# Patient Record
Sex: FEMALE | Race: WHITE | NOT HISPANIC OR LATINO | Employment: UNEMPLOYED | URBAN - METROPOLITAN AREA
[De-identification: names, ages, dates, MRNs, and addresses within clinical notes are randomized per-mention and may not be internally consistent; named-entity substitution may affect disease eponyms.]

---

## 2020-10-24 ENCOUNTER — HOSPITAL ENCOUNTER (EMERGENCY)
Facility: HOSPITAL | Age: 40
Discharge: HOME | End: 2020-10-24
Attending: EMERGENCY MEDICINE | Admitting: EMERGENCY MEDICINE

## 2020-10-24 VITALS
RESPIRATION RATE: 16 BRPM | HEART RATE: 66 BPM | SYSTOLIC BLOOD PRESSURE: 110 MMHG | TEMPERATURE: 98.3 F | OXYGEN SATURATION: 98 % | DIASTOLIC BLOOD PRESSURE: 75 MMHG

## 2020-10-24 DIAGNOSIS — R41.82 ALTERED MENTAL STATUS, UNSPECIFIED ALTERED MENTAL STATUS TYPE: Primary | ICD-10-CM

## 2020-10-24 DIAGNOSIS — F10.920 ACUTE ALCOHOLIC INTOXICATION WITHOUT COMPLICATION (HCC): ICD-10-CM

## 2020-10-24 PROCEDURE — 99283 EMERGENCY DEPT VISIT LOW MDM: CPT

## 2020-11-05 NOTE — ED PROVIDER NOTES
EMERGENCY DEPARTMENT ENCOUNTER    Room Number:  08/08  Date seen:  11/5/2020  Time seen: 18:38 EST  PCP: Priyanka Cortes APRN  Historian: patient and sister      HPI:  Chief Complaint: altered mental status    A complete HPI/ROS/PMH/PSH/SH/FH are unobtainable due to: none    Context: Misty Pastrana is a 39 y.o. female who presents to the ED for evaluation of altered mental status that now seems to have resolved.  It onset suddenly and lasted for about an hour and gradually improved.  It started after having 3 glasses of wine at dinner and her brothers place and when she and her sister arrived home the patient was too sedate to get out of the car, not responding properly.  Really worried sister and she then called 911.  The patient vomited during transport to the ER.  At this time she is awake alert and oriented.  She recalls dinner and feeling very drowsy in the car and coming to and vomiting in the EMS truck.  She denies any complaints at this time other than feeling anxious that she is in the ER.  She denies any current nausea, abdominal pain or diarrhea, chest pain shortness of breath as well as any subjective fever or chills.        PAST MEDICAL HISTORY  Active Ambulatory Problems     Diagnosis Date Noted   • No Active Ambulatory Problems     Resolved Ambulatory Problems     Diagnosis Date Noted   • No Resolved Ambulatory Problems     No Additional Past Medical History         PAST SURGICAL HISTORY  No past surgical history on file.      FAMILY HISTORY  No family history on file.      SOCIAL HISTORY  Social History     Socioeconomic History   • Marital status:      Spouse name: Not on file   • Number of children: Not on file   • Years of education: Not on file   • Highest education level: Not on file         ALLERGIES  Patient has no known allergies.        REVIEW OF SYSTEMS  Review of Systems     All systems reviewed and negative except for those discussed in HPI.       PHYSICAL EXAM  ED  Triage Vitals   Temp Heart Rate Resp BP SpO2   10/24/20 0205 10/24/20 0017 10/24/20 0017 10/24/20 0017 10/24/20 0017   98.3 °F (36.8 °C) 98 16 116/73 98 %      Temp src Heart Rate Source Patient Position BP Location FiO2 (%)   -- -- -- -- --                GENERAL: not distressed  HENT: atraumatic  EYES: no scleral icterus  CV: regular rhythm, regular rate  RESPIRATORY: normal effort CTA B  ABDOMEN: soft, nontender nondistended  MUSCULOSKELETAL: no deformity  NEURO: alert, oriented x3, moves all extremities, follows commands, nonfocal neurologic exam  SKIN: warm, dry    Vital signs and nursing notes reviewed.              PROGRESS AND CONSULTS      I suspect the patient's altered mental status, drowsiness and vomiting was secondary to alcohol intoxication.  The patient verbalizes this as well.  I have offered her further testing including labs, alcohol level and a drug screen as well as medication for nausea and rehydration with IV fluids.  She states she feels fine, is back to baseline and would like to go home, declines any further testing or treatment.  Sister is comfortable taking her home now, states she is back to baseline.  I think this is very reasonable and the patient will be discharged.        Reviewed pt's history and workup with Dr. Link.  After a bedside evaluation; they agree with the plan of care      Patient was placed in face mask in first look. Patient was wearing facemask each time I entered the room and throughout our encounter. I wore protective equipment throughout this patient encounter including a face mask, eye shield and gloves. Hand hygiene was performed before donning protective equipment and after removal when leaving the room.        DIAGNOSIS  Final diagnoses:   Altered mental status, unspecified altered mental status type   Acute alcoholic intoxication without complication (CMS/HCC)               Latest Documented Vital Signs:  As of 18:38 EST  BP- 110/75 HR- 66 Temp- 98.3 °F (36.8  °C) O2 sat- 98%       Nae Haynes PA  11/05/20 1841

## 2020-11-19 ENCOUNTER — TELEPHONE (OUTPATIENT)
Dept: ORTHOPEDICS | Facility: OTHER | Age: 40
End: 2020-11-19

## 2020-12-03 ENCOUNTER — TREATMENT (OUTPATIENT)
Dept: PHYSICAL THERAPY | Facility: CLINIC | Age: 40
End: 2020-12-03

## 2020-12-03 DIAGNOSIS — M62.9 MUSCULOSKELETAL DISORDER INVOLVING UPPER TRAPEZIUS MUSCLE: ICD-10-CM

## 2020-12-03 DIAGNOSIS — M54.2 CERVICAL PAIN: Primary | ICD-10-CM

## 2020-12-03 DIAGNOSIS — M54.6 ACUTE LEFT-SIDED THORACIC BACK PAIN: ICD-10-CM

## 2020-12-03 PROCEDURE — 97162 PT EVAL MOD COMPLEX 30 MIN: CPT | Performed by: PHYSICAL THERAPIST

## 2020-12-03 PROCEDURE — 97014 ELECTRIC STIMULATION THERAPY: CPT | Performed by: PHYSICAL THERAPIST

## 2020-12-03 PROCEDURE — 97110 THERAPEUTIC EXERCISES: CPT | Performed by: PHYSICAL THERAPIST

## 2020-12-04 NOTE — PROGRESS NOTES
Physical Therapy Initial Evaluation and Plan of Care      Patient: Misty Pastrana   : 1980  Diagnosis/ICD-10 Code:  Cervical pain [M54.2]  Referring practitioner: DAREK Eugene  Date of Initial Visit: 12/3/2020  Today's Date: 2020  Patient seen for 1 sessions           Subjective Evaluation    History of Present Illness  Mechanism of injury: Pt notes hx of anxiety/ depression effecting symptoms.  Pain with reaching/ lifting/ supporting son with dressing-grooming-bathing activities. Denies headaches. Pt reports poor tolerance with sleep some days not sleeping.    Subjective comment: chronic progressively acute (L) UT/ perscapular pain.  reports intermittent bouts of (L) c5-6 radicular pain.   Quality of life: good    Pain  Current pain ratin  At best pain ratin  At worst pain ratin  Quality: burning, dull ache, throbbing, tight, radiating, sharp and cramping  Aggravating factors: overhead activity, sleeping, outstretched reach, repetitive movement, lifting and movement  Progression: worsening    Patient Goals  Patient goals for therapy: increased strength, independence with ADLs/IADLs, return to work, increased motion, improved balance and decreased pain             Objective        Special Questions  Patient is experiencing disturbed sleep.       Postural Observations  Seated posture: fair  Standing posture: fair        Palpation   Left   Hypertonic in the levator scapulae, lower trapezius, middle trapezius, rhomboids and upper trapezius.   Muscle spasm in the levator scapulae, lower trapezius, middle trapezius, rhomboids and upper trapezius.   Tenderness of the levator scapulae, lower trapezius, middle trapezius, rhomboids and upper trapezius.     Neurological Testing     Sensation   Cervical/Thoracic   Left   Intact: light touch    Right   Intact: light touch    Reflexes   Left   Deltoid (C5): trace (1+)  Biceps (C5/C6): trace (1+)  Brachioradialis (C6): trace (1+)    Right    Deltoid (C5): trace (1+)  Biceps (C5/C6): trace (1+)  Brachioradialis (C6): trace (1+)    Active Range of Motion   Cervical/Thoracic Spine   Cervical    Flexion: 45 degrees   Left lateral flexion: 25 degrees with pain  Right lateral flexion: 12 degrees   Left rotation: 29 degrees with pain  Right rotation: 22 degrees     Thoracic   Flexion: WFL  Extension: with pain    Additional Active Range of Motion Details  (+) scapulohumeral dyskinesia; (+) scapular winging (B) ; atrophy of serratus anterior    Strength/Myotome Testing   Cervical Spine   Neck extension: 3+  Neck flexion: 3+    Left   Neck lateral flexion (C3): 3+  Levator scapulae (C4): 4-    Right   Neck lateral flexion (C3): 3+  Levator scapulae (C4): 3+    Left Shoulder     Planes of Motion   Flexion: 3+   Extension: 4-   Abduction: 3+   Adduction: 4   External rotation at 0°: 4-   Internal rotation at 0°: 4+     Isolated Muscles   Levator scapulae: 4-   Lower trapezius: 4-   Middle trapezius: 3+   Serratus anterior: 3+   Upper trapezius: 4+     Right Shoulder     Planes of Motion   Flexion: 3+   Extension: 4-   Abduction: 3+   Adduction: 4   External rotation at 0°: 4-   Internal rotation at 0°: 4+     Isolated Muscles   Levator scapulae: 3+   Lower trapezius: 3+   Middle trapezius: 3+   Serratus anterior: 3+   Upper trapezius: 4     Left Elbow   Flexion: 4+  Extension: 4+    Right Elbow   Flexion: 4+  Extension: 4+    Left Wrist/Hand   Wrist extension: 4+  Wrist flexion: 4+  Radial deviation: 4+  Ulnar deviation: 4+    Right Wrist/Hand   Wrist extension: 4+  Wrist flexion: 4+  Radial deviation: 4+  Ulnar deviation: 4+        See Exercise, Manual, and Modality Logs for complete treatment.   Functional outcome score: 54%cervical  oswestry            Assessment & Plan     Assessment  Impairments: abnormal gait, abnormal or restricted ROM, impaired balance, impaired physical strength, pain with function and safety issue  Assessment details: Misty Pastrana  is a 40 y.o. year-old female referred to physical therapy for cervical/ thoracic pain/ (L) upper trapezius strain. She presents with a stable clinical presentation.  She has comorbidities of anxiety and no personal factors that may affect her progress in the plan of care.  Pt demonstrates decreased cervical/ thoracic AROM/ MMT resulting in acute bouts of (L) UT/rhomboid/ serratus anterior ms spasm.  (+) scapulohumeral dyskinesia is noted.  Pt reports 6/ 10 pain , poor tolerance with prolonged sleep/ management of son secondary to difficulty with reaching/ lifting/ pulling activities. Signs and symptoms are consistent with physical therapy diagnosis of chronically acute cervical/ thoracic pain/ strain  Prognosis: good  Functional Limitations: walking, uncomfortable because of pain, standing and stooping  Goals  Plan Goals: stg 6 wks    Pt to be educated/ independent with initial HEP specific to precautions/ restrictions/ correct sitting posture.    Decreased (L) UT/ serratus anterior ttp from severe to minimal to allow for 6 hours continuous sleep.    Increased cervical AROM in rotation/ sB  (R)= (L) to allow for increased ease w dressing/ grooming activities.    ltg 12 wks    Increase cervical/ thoracic postural strength 4/5 at 90 degrees to allow for increased ease with reaching/ light/ caring for son without evidence of pain > 2/10 consistently.    Pt to deny radicular pain (B) UE consistently to allow for increased ease with dressing/ grooming/ reaching activities.     Improve oswestry score from 54% to 34%    Plan  Therapy options: will be seen for skilled physical therapy services  Planned modality interventions: cryotherapy, electrical stimulation/Russian stimulation, TENS, ultrasound and thermotherapy (hydrocollator packs)  Planned therapy interventions: abdominal trunk stabilization, manual therapy, motor coordination training, neuromuscular re-education, balance/weight-bearing training, ADL retraining,  flexibility, functional ROM exercises, gait training, home exercise program, joint mobilization, transfer training, therapeutic activities, strengthening, stretching and soft tissue mobilization  Duration in weeks: 12  Treatment plan discussed with: patient        Timed:  Manual Therapy:       mins  00306;  Therapeutic Exercise: 15        mins  05596;     Neuromuscular Melissa:        mins  26696;    Therapeutic Activity:          mins  69582;     Gait Training:           mins  15538;     Ultrasound:          mins  65563;    Electrical Stimulation:     8    mins  01846 ( );  Iontophoresis         mins 84929  Dry Needling        mins      Untimed:  Electrical Stimulation:         mins  40972 ( );  Mechanical Traction:         mins  96292;     Timed Treatment:   23 mins   Total Treatment:    50 mins    PT SIGNATURE: Ck Pennington, PT   DATE TREATMENT INITIATED: 12/4/2020    Initial Certification  Certification Period: 3/4/2021  I certify that the therapy services are furnished while this patient is under my care.  The services outlined above are required by this patient, and will be reviewed every 90 days.     PHYSICIAN: Jacques Turner, DAREK      DATE:     Please sign and return via fax to [phone] Thank you, [facility].

## 2020-12-08 ENCOUNTER — TREATMENT (OUTPATIENT)
Dept: PHYSICAL THERAPY | Facility: CLINIC | Age: 40
End: 2020-12-08

## 2020-12-08 DIAGNOSIS — M62.9 MUSCULOSKELETAL DISORDER INVOLVING UPPER TRAPEZIUS MUSCLE: ICD-10-CM

## 2020-12-08 DIAGNOSIS — M54.2 CERVICAL PAIN: Primary | ICD-10-CM

## 2020-12-08 PROCEDURE — 97110 THERAPEUTIC EXERCISES: CPT | Performed by: PHYSICAL THERAPIST

## 2020-12-08 PROCEDURE — 97530 THERAPEUTIC ACTIVITIES: CPT | Performed by: PHYSICAL THERAPIST

## 2020-12-08 NOTE — PROGRESS NOTES
Physical Therapy Daily Progress Note    Patient: Misty Pastrana   : 1980  Diagnosis/ICD-10 Code:  Cervical pain [M54.2]  Referring practitioner: DAREK Eugene  Date of Initial Visit: Type: THERAPY  Noted: 2020  Today's Date: 2020  Patient seen for 2 sessions           Subjective compliant with precautions/ restrictions. Comfortable with initial HEP    Objective   See Exercise, Manual, and Modality Logs for complete treatment.       Assessment/Plan  Progressed with cervical/ thoracic AROM; implemented mild periscapular AROM/ stabilization per tolerance without exacerbation ofacute symptoms of pain  Issued progressed HEPL          Timed:    Manual Therapy:        mins  20303;  Therapeutic Exercise:       15  mins  38772;     Neuromuscular Melissa:        mins  63861;    Therapeutic Activity:       30   mins  93179;     Gait Training:           mins  40293;     Ultrasound:          mins  24429;    Electrical Stimulation:         mins  28605 ( );  Iontophoresis         mins 98161;  Aquatic Therapy         mins 70577;  Dry Needling                   mins    Untimed:  Electrical Stimulation:         mins  24256 (MC );  Mechanical Traction:         mins  66869;     Timed 45:      mins   Total Treatment:    45    mins  Ck Pennington, PT  Physical Therapist

## 2020-12-15 ENCOUNTER — TREATMENT (OUTPATIENT)
Dept: PHYSICAL THERAPY | Facility: CLINIC | Age: 40
End: 2020-12-15

## 2020-12-15 DIAGNOSIS — M62.9 MUSCULOSKELETAL DISORDER INVOLVING UPPER TRAPEZIUS MUSCLE: ICD-10-CM

## 2020-12-15 DIAGNOSIS — M54.2 CERVICAL PAIN: Primary | ICD-10-CM

## 2020-12-15 PROCEDURE — 97110 THERAPEUTIC EXERCISES: CPT | Performed by: PHYSICAL THERAPIST

## 2020-12-15 PROCEDURE — 97530 THERAPEUTIC ACTIVITIES: CPT | Performed by: PHYSICAL THERAPIST

## 2020-12-15 NOTE — PROGRESS NOTES
Physical Therapy Daily Progress Note    Patient: Misty Pastrana   : 1980  Diagnosis/ICD-10 Code:  Cervical pain [M54.2]  Referring practitioner: DAREK Eugene  Date of Initial Visit: Type: THERAPY  Noted: 2020  Today's Date: 12/15/2020  Patient seen for 3 sessions           Subjective off my IUD.  hormorne levels for not good.  Im sleeping much better. now    Objective   See Exercise, Manual, and Modality Logs for complete treatment.       Assessment/Plan    Mild cuing with modified postural strengthening / AROM activities per pt tolerance. Pt guarding with movement, decreaed apprehension with AROM this session . Encouraged with progress minimal pain today. Issued progressed HEP       Timed:    Manual Therapy:     15   mins  00360;  Therapeutic Exercise:         mins  01550;     Neuromuscular Melissa:        mins  06880;    Therapeutic Activity:    20      mins  67848;     Gait Training:           mins  20956;     Ultrasound:          mins  91492;    Electrical Stimulation:         mins  43408 ( );  Iontophoresis         mins 10142;  Aquatic Therapy         mins 60351;  Dry Needling                   mins    Untimed:  Electrical Stimulation:         mins  13369 (MC );  Mechanical Traction:         mins  36507;     Timed Treatment:35      mins   Total Treatment:    35    mins  Ck Pennington, PT  Physical Therapist

## 2020-12-17 ENCOUNTER — TREATMENT (OUTPATIENT)
Dept: PHYSICAL THERAPY | Facility: CLINIC | Age: 40
End: 2020-12-17

## 2020-12-17 DIAGNOSIS — M54.2 CERVICAL PAIN: Primary | ICD-10-CM

## 2020-12-17 DIAGNOSIS — M62.9 MUSCULOSKELETAL DISORDER INVOLVING UPPER TRAPEZIUS MUSCLE: ICD-10-CM

## 2020-12-17 PROCEDURE — 97530 THERAPEUTIC ACTIVITIES: CPT | Performed by: PHYSICAL THERAPIST

## 2020-12-17 PROCEDURE — 97110 THERAPEUTIC EXERCISES: CPT | Performed by: PHYSICAL THERAPIST

## 2020-12-17 NOTE — PROGRESS NOTES
Physical Therapy Daily Progress Note    Patient: Misty Pastrana   : 1980  Diagnosis/ICD-10 Code:  Cervical pain [M54.2]  Referring practitioner: DAREK Eugene  Date of Initial Visit: Type: THERAPY  Noted: 2020  Today's Date: 2020  Patient seen for 4 sessions           Subjective symptoms improving more confidence with moving.  Pain controlled and minimal at this time. Denies headaches  Objective   See Exercise, Manual, and Modality Logs for complete treatment.       Assessment/Plan  Progressed with thoracic / shoulder AROM; implemented periscapular strengthening below 90 degrees no pain with pre.  Pt very encouraged with progress.  Apprehension with shoudler flexion > 165  (R) UE.          Timed:    Manual Therapy:       mins  53499;  Therapeutic Exercise:      30   mins  51995;     Neuromuscular Melissa:        mins  23592;    Therapeutic Activity:      10    mins  10881;     Gait Training:           mins  11745;     Ultrasound:          mins  18827;    Electrical Stimulation:         mins  05663 ( );  Iontophoresis         mins 08935;  Aquatic Therapy         mins 25543;  Dry Needling                   mins    Untimed:  Electrical Stimulation:         mins  40942 (MC );  Mechanical Traction:         mins  24970;     Timed Treatment:   40   mins   Total Treatment:      40  mins  Ck Pennington, PT  Physical Therapist

## 2020-12-23 ENCOUNTER — TREATMENT (OUTPATIENT)
Dept: PHYSICAL THERAPY | Facility: CLINIC | Age: 40
End: 2020-12-23

## 2020-12-23 DIAGNOSIS — M54.6 ACUTE LEFT-SIDED THORACIC BACK PAIN: ICD-10-CM

## 2020-12-23 DIAGNOSIS — M54.2 CERVICAL PAIN: Primary | ICD-10-CM

## 2020-12-23 DIAGNOSIS — M62.9 MUSCULOSKELETAL DISORDER INVOLVING UPPER TRAPEZIUS MUSCLE: ICD-10-CM

## 2020-12-23 PROCEDURE — 97110 THERAPEUTIC EXERCISES: CPT | Performed by: PHYSICAL THERAPIST

## 2020-12-23 PROCEDURE — 97014 ELECTRIC STIMULATION THERAPY: CPT | Performed by: PHYSICAL THERAPIST

## 2020-12-23 PROCEDURE — 97530 THERAPEUTIC ACTIVITIES: CPT | Performed by: PHYSICAL THERAPIST

## 2020-12-23 NOTE — PROGRESS NOTES
Physical Therapy Daily Progress Note    Patient: Misty Pastrana   : 1980  Diagnosis/ICD-10 Code:  Cervical pain [M54.2]  Referring practitioner: DAREK Eugene  Date of Initial Visit: Type: THERAPY  Noted: 2020  Today's Date: 2020  Patient seen for 5 sessions           Subjective exacerbation of cervical/ thoracic pain. I helped my son ice skate. Holding him excacerbated my symptoms.     Objective   See Exercise, Manual, and Modality Logs for complete treatment.       Assessment/Plan    progresseed with tens mh to decrease periscapular pain. Implemented cervical/ thoracic AROM per tolerance.mild cuing with HEP. Decreased pain from 6/ 10 to 3/ 10 post treatment       Timed:    Manual Therapy:        mins  59285;  Therapeutic Exercise:   15    mins  25025;     Neuromuscular Melissa:       mins  64519;    Therapeutic Activity:      15    mins  63381;     Gait Training:           mins  99902;     Ultrasound:          mins  58214;    Electrical Stimulation:    10     mins  54524 ( );  Iontophoresis         mins 47497;  Aquatic Therapy         mins 72090;  Dry Needling                   mins    Untimed:  Electrical Stimulation:         mins  78511 ( );  Mechanical Traction:         mins  22156;     Timed Treatment:  40    mins   Total Treatment:  40      mins  Ck Pennington PT  Physical Therapist

## 2020-12-28 ENCOUNTER — TREATMENT (OUTPATIENT)
Dept: PHYSICAL THERAPY | Facility: CLINIC | Age: 40
End: 2020-12-28

## 2020-12-28 DIAGNOSIS — M62.9 MUSCULOSKELETAL DISORDER INVOLVING UPPER TRAPEZIUS MUSCLE: ICD-10-CM

## 2020-12-28 DIAGNOSIS — M54.2 CERVICAL PAIN: Primary | ICD-10-CM

## 2020-12-28 PROCEDURE — 97014 ELECTRIC STIMULATION THERAPY: CPT | Performed by: PHYSICAL THERAPIST

## 2020-12-28 PROCEDURE — 97110 THERAPEUTIC EXERCISES: CPT | Performed by: PHYSICAL THERAPIST

## 2020-12-28 NOTE — PROGRESS NOTES
Physical Therapy Daily Progress Note    Patient: Misty Pastrana   : 1980  Diagnosis/ICD-10 Code:  Cervical pain [M54.2]  Referring practitioner: DAREK Eugene  Date of Initial Visit: Type: THERAPY  Noted: 2020  Today's Date: 2020  Patient seen for 6 sessions           Subjective muscle pain is better. Radiating tricep pain.      Objective   See Exercise, Manual, and Modality Logs for complete treatment.       Assessment/Plan   moderate ttp (B) UT/ periscapular region. Continued with postural AROM/ thoracic AROM; strengthening per tolerance implemented tens to decreased UT ms guarding.          Timed:    Manual Therapy:        mins  86158;  Therapeutic Exercise:  25       mins  06310;     Neuromuscular Melissa:        mins  05542;    Therapeutic Activity:          mins  64122;     Gait Training:           mins  74953;     Ultrasound:          mins  48469;    Electrical Stimulation:         mins  66014 ( );  Iontophoresis         mins 85055;  Aquatic Therapy         mins 56478;  Dry Needling                   mins    Untimed:  Electrical Stimulation:    10     mins  15859 ( );  Mechanical Traction:         mins  65538;     Timed Treatment:  35    mins   Total Treatment:     35   mins  Ck Pennington, PT  Physical Therapist

## 2020-12-30 ENCOUNTER — TELEPHONE (OUTPATIENT)
Dept: ORTHOPEDICS | Facility: OTHER | Age: 40
End: 2020-12-30

## 2021-01-15 ENCOUNTER — TREATMENT (OUTPATIENT)
Dept: PHYSICAL THERAPY | Facility: CLINIC | Age: 41
End: 2021-01-15

## 2021-01-15 DIAGNOSIS — M54.2 CERVICAL PAIN: Primary | ICD-10-CM

## 2021-01-15 DIAGNOSIS — M54.6 ACUTE LEFT-SIDED THORACIC BACK PAIN: ICD-10-CM

## 2021-01-15 DIAGNOSIS — M62.9 MUSCULOSKELETAL DISORDER INVOLVING UPPER TRAPEZIUS MUSCLE: ICD-10-CM

## 2021-01-15 PROCEDURE — 97110 THERAPEUTIC EXERCISES: CPT | Performed by: PHYSICAL THERAPIST

## 2021-01-15 PROCEDURE — 97530 THERAPEUTIC ACTIVITIES: CPT | Performed by: PHYSICAL THERAPIST

## 2021-01-15 NOTE — PROGRESS NOTES
30-Day / 10-Visit Progress Note         Patient: Misty Pastrana   : 1980  Diagnosis/ICD-10 Code:  Cervical pain [M54.2]  Referring practitioner: DAREK Eugene  Date of Initial Visit: Type: THERAPY  Noted: 2020  Today's Date: 1/15/2021  Patient seen for 7 sessions      Subjective:     Clinical Progress: improved  Home Program Compliance: Yes  Treatment has included:  therapeutic exercise    Subjective Evaluation    History of Present Illness    Subjective comment: pt denies headache.  no pain at this time. symptoms now more intermittent towards later in the day. not constant pain. encouraged with progress. Quality of life: good    Pain  Current pain ratin  At best pain ratin  At worst pain ratin  Quality: dull ache, tight, sharp and radiating    Patient Goals  Patient goals for therapy: increased strength, independence with ADLs/IADLs, return to sport/leisure activities, return to work, increased motion, improved balance and decreased pain         Objective   See Exercise, Manual, and Modality Logs for complete treatment.         Assessment & Plan     Assessment  Impairments: abnormal gait, abnormal or restricted ROM, activity intolerance, impaired balance, impaired physical strength, pain with function and safety issue  Assessment details: Misty Pastrana is a 40 y.o. year-old female referred to physical therapy for cervical/ thoracic pain/ (L) upper trapezius strain. She presents with a stable clinical presentation.  She has comorbidities of anxiety and no personal factors that may affect her progress in the plan of care. Pt has been seen in PT intermittently secondary to holidays and is progressing well with all intervention provided as evidence of intermittent pain pattern/ denies recent hx of headaches.  Pt exhibits full cervical / thoracic AROM. Mild ttp (L) UT.  Pt continues to exhibit decreased cervical/ thoracic postural strength specific but not limited to serratus  anterior 4-/5 at 90 degrees shoulder flexion.  Increased ease with continuous sleep of 6 hours, however continues to note difficulty with management of son secondary to difficulty with reaching/ lifting/ pulling activities. Pt is appropriate for recertification at this time to allow for attainment of functional goals.  Pt agrees with POC and is satisfied with all intervention provided. Signs and symptoms are consistent with physical therapy diagnosis of chronically acute cervical/ thoracic pain/ strain  Prognosis: good    Prognosis: good  Functional Limitations: walking, uncomfortable because of pain, standing and stooping  Goals  Plan Goals: Plan Goals: stg 6 wks    Pt to be educated/ independent with initial HEP specific to precautions/ restrictions/ correct sitting posture. met    Decreased (L) UT/ serratus anterior ttp from severe to minimal to allow for 6 hours continuous sleep. met    Increased cervical AROM in rotation/ sB  (R)= (L) to allow for increased ease w dressing/ grooming activities. met    ltg 12 wks    Increase cervical/ thoracic postural strength 4/5 at 90 degrees to allow for increased ease with reaching/ light/ caring for son without evidence of pain > 2/10 consistently. ongoing    Pt to deny radicular pain (B) UE consistently to allow for increased ease with dressing/ grooming/ reaching activities. ongoing    Improve oswestry score from 54% to 34% ongoing      Plan  Therapy options: will be seen for skilled physical therapy services  Planned modality interventions: cryotherapy, electrical stimulation/Russian stimulation, TENS, ultrasound and thermotherapy (hydrocollator packs)  Planned therapy interventions: abdominal trunk stabilization, manual therapy, motor coordination training, neuromuscular re-education, balance/weight-bearing training, ADL retraining, flexibility, functional ROM exercises, gait training, home exercise program, joint mobilization, transfer training, therapeutic  activities, strengthening, stretching and soft tissue mobilization  Frequency: 1x week  Duration in weeks: 12  Treatment plan discussed with: patient           Recommendations: Continue as planned  Timeframe: 1 month  Prognosis to achieve goals: good    PT Signature: Ck Pennington, PT      Based upon review of the patient's progress and continued therapy plan, it is my medical opinion that Misty Pastrana should continue physical therapy treatment at [facility]  [address] [place] [address]  [phone].    Signature: __________________________________  Jacques Turner APRN    Timed:  Manual Therapy:         mins  62908;  Therapeutic Exercise:     15    mins  86502;     Neuromuscular Melissa:        mins  87675;    Therapeutic Activity:      24    mins  64063;     Gait Training:           mins  93091;     Ultrasound:          mins  26180;    Electrical Stimulation:         mins  84033 ( );  Iontophoresis         mins 50030;  Dry Needling                   mins    Untimed:  Electrical Stimulation:         mins  25839 ( );  Mechanical Traction:         mins  30074;     Timed Treatment: 39     mins   Total Treatment:     39   mins

## 2021-02-03 ENCOUNTER — TREATMENT (OUTPATIENT)
Dept: PHYSICAL THERAPY | Facility: CLINIC | Age: 41
End: 2021-02-03

## 2021-02-03 DIAGNOSIS — M62.9 MUSCULOSKELETAL DISORDER INVOLVING UPPER TRAPEZIUS MUSCLE: ICD-10-CM

## 2021-02-03 DIAGNOSIS — M54.2 CERVICAL PAIN: Primary | ICD-10-CM

## 2021-02-03 PROCEDURE — 97530 THERAPEUTIC ACTIVITIES: CPT | Performed by: PHYSICAL THERAPIST

## 2021-02-03 PROCEDURE — 97110 THERAPEUTIC EXERCISES: CPT | Performed by: PHYSICAL THERAPIST

## 2021-02-04 NOTE — PROGRESS NOTES
Physical Therapy Daily Progress Note    Patient: Misty Pastrana   : 1980  Diagnosis/ICD-10 Code:  Cervical pain [M54.2]  Referring practitioner: DAREK Eugene  Date of Initial Visit: Type: THERAPY  Noted: 2020  Today's Date: 2021  Patient seen for 8 sessions           Subjective very encouraged. No head ache.  I have days with no pain.    Objective   See Exercise, Manual, and Modality Logs for complete treatment.       Assessment/Plan  Intermittent symptoms of cervical/ periscapular ms spasm.  Symptoms resolving. Full cervical/ thoracic / shoulder AROM  Mild cuing with periscapular strengthening below 90 degrees.            Timed:    Manual Therapy:        mins  83643;  Therapeutic Exercise:         mins  86284;   10  Neuromuscular Melissa:        mins  37783;    Therapeutic Activity:      25    mins  77858;     Gait Training:           mins  11871;     Ultrasound:          mins  19540;    Electrical Stimulation:         mins  16176 ( );  Iontophoresis         mins 66760;  Aquatic Therapy         mins 31153;  Dry Needling                   mins    Untimed:  Electrical Stimulation:         mins  32074 ( );  Mechanical Traction:         mins  91670;     Timed Treatment:  35    mins   Total Treatment:     35   mins  Ck Pennington, PT  Physical Therapist

## 2021-02-08 ENCOUNTER — OFFICE VISIT (OUTPATIENT)
Dept: OBSTETRICS AND GYNECOLOGY | Facility: CLINIC | Age: 41
End: 2021-02-08

## 2021-02-08 VITALS
BODY MASS INDEX: 21.44 KG/M2 | HEIGHT: 64 IN | WEIGHT: 125.6 LBS | DIASTOLIC BLOOD PRESSURE: 86 MMHG | SYSTOLIC BLOOD PRESSURE: 136 MMHG

## 2021-02-08 DIAGNOSIS — Z80.3 FAMILY HISTORY OF BREAST CANCER: Primary | ICD-10-CM

## 2021-02-08 DIAGNOSIS — Z13.9 SCREENING FOR UNSPECIFIED CONDITION: ICD-10-CM

## 2021-02-08 DIAGNOSIS — Z30.2 REQUEST FOR STERILIZATION: ICD-10-CM

## 2021-02-08 LAB
B-HCG UR QL: NEGATIVE
BILIRUB BLD-MCNC: NEGATIVE MG/DL
CLARITY, POC: CLEAR
COLOR UR: YELLOW
GLUCOSE UR STRIP-MCNC: NEGATIVE MG/DL
INTERNAL NEGATIVE CONTROL: NEGATIVE
INTERNAL POSITIVE CONTROL: POSITIVE
KETONES UR QL: NEGATIVE
LEUKOCYTE EST, POC: NEGATIVE
Lab: NORMAL
NITRITE UR-MCNC: NEGATIVE MG/ML
PH UR: 5 [PH] (ref 5–8)
PROT UR STRIP-MCNC: NEGATIVE MG/DL
RBC # UR STRIP: NEGATIVE /UL
SP GR UR: 1 (ref 1–1.03)
UROBILINOGEN UR QL: NORMAL

## 2021-02-08 PROCEDURE — 99203 OFFICE O/P NEW LOW 30 MIN: CPT | Performed by: OBSTETRICS & GYNECOLOGY

## 2021-02-08 PROCEDURE — 81025 URINE PREGNANCY TEST: CPT | Performed by: OBSTETRICS & GYNECOLOGY

## 2021-02-08 RX ORDER — NAPROXEN 500 MG/1
500 TABLET ORAL 2 TIMES DAILY PRN
COMMUNITY
Start: 2020-12-23 | End: 2021-07-01

## 2021-02-08 RX ORDER — IBUPROFEN 400 MG/1
400 TABLET ORAL EVERY 6 HOURS PRN
COMMUNITY

## 2021-02-08 RX ORDER — SODIUM CHLORIDE 0.9 % (FLUSH) 0.9 %
10 SYRINGE (ML) INJECTION AS NEEDED
Status: CANCELLED | OUTPATIENT
Start: 2021-02-08

## 2021-02-08 RX ORDER — BACLOFEN 10 MG/1
10 TABLET ORAL 4 TIMES DAILY PRN
COMMUNITY

## 2021-02-08 RX ORDER — SODIUM CHLORIDE 0.9 % (FLUSH) 0.9 %
3 SYRINGE (ML) INJECTION EVERY 12 HOURS SCHEDULED
Status: CANCELLED | OUTPATIENT
Start: 2021-02-08

## 2021-02-08 NOTE — PROGRESS NOTES
"EVALUATION AND MANAGEMENT ENCOUNTER    Misty Pastrana  Patient new to examiner? Yes  New problem to examiner? Yes  Patient referred? Yes    -----------------------------------------------------HISTORY---------------------------------------------------    Chief Complaint:   Chief Complaint   Patient presents with   • Contraception     bc management       HPI:  Misty Pastrana is a 40 y.o. No obstetric history on file. with Patient's last menstrual period was 01/31/2021. here to discuss permanent sterilization.  Pt can't tolerate any other form of contraception.  Desires tubal.  Pt also requests heriditary cancer screening. Pt has no complaints.         History of Present Illness     Misty Pastrana  reports that she has never smoked. She does not have any smokeless tobacco history on file..         ROS:  Review of Systems   Constitutional: Negative.    HENT: Negative.    Eyes: Negative.    Respiratory: Negative.    Cardiovascular: Negative.    Gastrointestinal: Negative.    Endocrine: Negative.    Musculoskeletal: Negative.    Skin: Negative.    Allergic/Immunologic: Negative.    Neurological: Negative.    Hematological: Negative.    Psychiatric/Behavioral: Negative.    :    -----------------------------------------------PHYSICAL EXAM----------------------------------------------    Vital Signs: /86   Ht 162.6 cm (64\")   Wt 57 kg (125 lb 9.6 oz)   LMP 01/31/2021   BMI 21.56 kg/m²    Flowsheet Rows      First Filed Value   Admission Height  162.6 cm (64\") Documented at 02/08/2021 1537   Admission Weight  57 kg (125 lb 9.6 oz) Documented at 02/08/2021 1537          Physical Exam  Vitals signs and nursing note reviewed.   Constitutional:       Appearance: She is well-developed.   HENT:      Head: Normocephalic and atraumatic.   Neck:      Musculoskeletal: Normal range of motion.   Cardiovascular:      Rate and Rhythm: Normal rate.   Pulmonary:      Effort: Pulmonary effort is normal.   Abdominal:      " General: There is no distension.      Palpations: Abdomen is soft. There is no mass.      Tenderness: There is no abdominal tenderness. There is no guarding.   Genitourinary:     Vagina: No vaginal discharge.   Musculoskeletal: Normal range of motion.         General: No tenderness or deformity.   Skin:     General: Skin is warm and dry.      Coloration: Skin is not pale.      Findings: No erythema or rash.   Neurological:      Mental Status: She is alert and oriented to person, place, and time.   Psychiatric:         Behavior: Behavior normal.         Thought Content: Thought content normal.         Judgment: Judgment normal.         -----------------------------------------------MEDICAL DECISION MAKING-----------------------------        DATA Review & labs ordered:     1.   Lab Results (last 24 hours)     Procedure Component Value Units Date/Time    POC Urinalysis Dipstick [[id number]]  (Normal) Collected: 02/08/21 1529    Specimen: Urine Updated: 02/08/21 1530     Color Yellow     Clarity, UA Clear     Glucose, UA Negative mg/dL      Bilirubin Negative     Ketones, UA Negative     Specific Gravity  1.005     Blood, UA Negative     pH, Urine 5.0     Protein, POC Negative mg/dL      Urobilinogen, UA Normal     Leukocytes Negative     Nitrite, UA Negative    POC Pregnancy, Urine [[id number]]  (Normal) Collected: 02/08/21 1529    Specimen: Urine Updated: 02/08/21 1529     HCG, Urine, QL Negative     Lot Number [id number]     Internal Positive Control Positive     Internal Negative Control Negative        2.   Imaging Results (Last 24 Hours)     ** No results found for the last 24 hours. **        3.   ECG/EMG Results (most recent)     None              Diagnoses and all orders for this visit:    1. Screening for unspecified condition (Primary)  -     POC Urinalysis Dipstick  -     POC Pregnancy, Urine    2. Request for sterilization  -     Case Request; Standing  -     CBC and Differential; Future  -     Pregnancy,  Urine - Urine, Clean Catch; Future  -     sodium chloride 0.9 % flush 3 mL  -     sodium chloride 0.9 % flush 10 mL  -     Case Request    Other orders  -     Follow Anesthesia Guidelines / Standing Orders; Future  -     Chlorhexidine Skin Prep; Future  -     Follow Anesthesia Guidelines / Standing Orders; Standing  -     Obtain informed consent; Standing  -     Place sequential compression device; Standing  -     Verify / Perform Chlorhexidine Skin Prep; Standing  -     hCG, Serum, Qualitative; Standing  -     Insert Peripheral IV; Standing  -     Saline Lock & Maintain IV Access; Standing        IMPRESSION/PROBLEM:      Indicated sterilization    (Established problem/s? No, worsening? No)    (New Problem/s? Yes, additional workup planned? No)      PLAN:     1. LAPAROSCOPIC BILATERAL SALPINGECTOMY FOR PERMANENT STERILIZATION.  2. RISKS, ALTERNATIVES, COMPLICATIONS OF THE PROCEDURE INCLUDING BUT NOT LIMITED TO:    INTRAOPERATIVE RISKS: INJURY TO INTERNAL ORGANS (BOWEL, BLADDER, URETER,BLOOD VESSELS) OR HEMORRHAGE REQUIRING FURTHER SURGERY, INFECTION, AND DEATH;   POSTOP COMPLICATIONS: BLEEDING, INFECTION, PNEUMONIA, PULMONARY EMBOLISM, AND DEATH;   WERE EXPLAINED TO THE PT WHO VERBALIZED HER UNDERSTANDING.    3, Heriditary cancer screening. [employer].    Pt to call for any results from testing promptly if she does not hear from us.     RTO Return in about 4 weeks (around 3/8/2021) for postop check..  Instructions and precautions given.           Bronson Sanford MD  22:02 EST  02/08/21

## 2021-02-17 ENCOUNTER — TREATMENT (OUTPATIENT)
Dept: PHYSICAL THERAPY | Facility: CLINIC | Age: 41
End: 2021-02-17

## 2021-02-17 DIAGNOSIS — M54.6 ACUTE LEFT-SIDED THORACIC BACK PAIN: ICD-10-CM

## 2021-02-17 DIAGNOSIS — M54.2 CERVICAL PAIN: Primary | ICD-10-CM

## 2021-02-17 DIAGNOSIS — M62.9 MUSCULOSKELETAL DISORDER INVOLVING UPPER TRAPEZIUS MUSCLE: ICD-10-CM

## 2021-02-17 PROCEDURE — 97530 THERAPEUTIC ACTIVITIES: CPT | Performed by: PHYSICAL THERAPIST

## 2021-02-17 PROCEDURE — 97110 THERAPEUTIC EXERCISES: CPT | Performed by: PHYSICAL THERAPIST

## 2021-02-18 NOTE — PROGRESS NOTES
30-Day / 10-Visit Progress Note         Patient: Misty Pastrana   : 1980  Diagnosis/ICD-10 Code:  Cervical pain [M54.2]  Referring practitioner: DAREK Eugene  Date of Initial Visit: Type: THERAPY  Noted: 2020  Today's Date: 2021  Patient seen for 9 sessions      Subjective:     Clinical Progress: improved  Home Program Compliance: Yes  Treatment has included:  therapeutic exercise    Subjective Evaluation    History of Present Illness    Subjective comment: no headaches/ sleeping 6-7 hours + a day;  thoracic pain more intermittent and less intense. Quality of life: good    Pain  Current pain ratin  At best pain ratin  At worst pain ratin  Quality: dull ache, radiating, throbbing, tight, sharp and pulling  Aggravating factors: overhead activity, outstretched reach, repetitive movement, standing, movement and lifting    Patient Goals  Patient goals for therapy: increased strength, independence with ADLs/IADLs, return to sport/leisure activities, return to work, increased motion, improved balance and decreased pain         Objective   See Exercise, Manual, and Modality Logs for complete treatment.         Assessment & Plan     Assessment  Impairments: abnormal gait, abnormal or restricted ROM, impaired balance, impaired physical strength, pain with function and safety issue  Assessment details: Misty Pastrana is a 40 y.o. year-old female referred to physical therapy for cervical/ thoracic pain/ (L) upper trapezius strain. She presents with a stable clinical presentation.  She has comorbidities of anxiety and no personal factors that may affect her progress in the plan of care. Pt has been seen in PT intermittently secondary to weather conditions and is progressing well with all intervention provided as evidence of intermittent pain pattern/ denies recent hx of headaches.  Pt exhibits full cervical / thoracic AROM. Mild ttp (L) UT.  Pt is progressing well with all  intervention provided as evidence decreased frequency/ intensity/ duration of pain symptoms. Current pt reports 2/ 10 (L) UT pain without symptoms of radiculopathy.   Posture strength is improving specific but not limited to serratus anterior 4/5 at 90 degrees shoulder flexion.  Increased ease with continuous sleep of 6 -7hours, however continues to note difficulty with management of son secondary to difficulty with reaching/ lifting/ pulling activities.full cervical/ thoracic/shoulder AROM is noted.  Pt denies difficulty with dressing/ bathing/ grooming;  Pt continues to note apprehension with lifting /reaching tasks.  Pt is appropriate for recertification at this time to allow for attainment of functional goals.  Pt agrees with POC and is satisfied with all intervention provided. Signs and symptoms are consistent with physical therapy diagnosis of chronically acute cervical/ thoracic pain/ strain  Prognosis: good    Prognosis: good  Functional Limitations: walking, uncomfortable because of pain, standing and stooping    Prognosis: good  Functional Limitations: walking, uncomfortable because of pain, standing and stooping  Goals  Plan Goals: Goals  Plan Goals: Plan Goals: stg 6 wks    Pt to be educated/ independent with initial HEP specific to precautions/ restrictions/ correct sitting posture. met    Decreased (L) UT/ serratus anterior ttp from severe to minimal to allow for 6 hours continuous sleep. met    Increased cervical AROM in rotation/ sB  (R)= (L) to allow for increased ease w dressing/ grooming activities. met    ltg 12 wks    Increase cervical/ thoracic postural strength 4/5 at 90 degrees to allow for increased ease with reaching/ light/ caring for son without evidence of pain > 2/10 consistently. ongoing    Pt to deny radicular pain (B) UE consistently to allow for increased ease with dressing/ grooming/ reaching activities. ongoing    Improve oswestry score from 54% to 34% ongoing       Plan  Therapy  options: will be seen for skilled physical therapy services  Planned modality interventions: cryotherapy, electrical stimulation/Russian stimulation, TENS, ultrasound and thermotherapy (hydrocollator packs)  Planned therapy interventions: abdominal trunk stabilization, manual therapy, motor coordination training, neuromuscular re-education, balance/weight-bearing training, ADL retraining, flexibility, functional ROM exercises, gait training, home exercise program, joint mobilization, transfer training, therapeutic activities, strengthening, stretching and soft tissue mobilization  Duration in weeks: 12  Treatment plan discussed with: patient           Recommendations: Continue as planned  Timeframe: 1 month  Prognosis to achieve goals: good    PT Signature: Ck Pennington, PT      Based upon review of the patient's progress and continued therapy plan, it is my medical opinion that Misty Pastrana should continue physical therapy treatment at [facility]  [address] [place] [address] [address] [place] [id number] [phone].    Signature: __________________________________  Jacques Turner APRN    Timed:  Manual Therapy:        mins  22544;  Therapeutic Exercise:  10       mins  28735;     Neuromuscular Melissa:        mins  98891;    Therapeutic Activity:      30    mins  75995;     Gait Training:           mins  31599;     Ultrasound:          mins  70740;    Electrical Stimulation:         mins  24235 ( );  Iontophoresis         mins 22127;  Dry Needling                   mins    Untimed:  Electrical Stimulation:         mins  35929 ( );  Mechanical Traction:         mins  39601;     Timed Treatment:  40    mins   Total Treatment:     40   mins

## 2021-03-01 ENCOUNTER — TREATMENT (OUTPATIENT)
Dept: PHYSICAL THERAPY | Facility: CLINIC | Age: 41
End: 2021-03-01

## 2021-03-01 DIAGNOSIS — M54.2 CERVICAL PAIN: Primary | ICD-10-CM

## 2021-03-01 PROCEDURE — 97530 THERAPEUTIC ACTIVITIES: CPT | Performed by: PHYSICAL THERAPIST

## 2021-03-01 PROCEDURE — 97110 THERAPEUTIC EXERCISES: CPT | Performed by: PHYSICAL THERAPIST

## 2021-03-02 NOTE — PROGRESS NOTES
30-Day / 10-Visit Progress Note         Patient: Misty Pastrana   : 1980  Diagnosis/ICD-10 Code:  No primary diagnosis found.  Referring practitioner: ADREK Eugene  Date of Initial Visit: No linked episodes  Today's Date: 3/2/2021  Patient seen for Visit count could not be calculated. Make sure you are using a visit which is associated with an episode. sessions      Subjective:     Clinical Progress: improved  Home Program Compliance: Yes  Treatment has included:  therapeutic exercise    Subjective Evaluation    History of Present Illness    Subjective comment: im sleeping well. 75% decrease in headaches.  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  i fatigue ease but pain is minimal now. Quality of life: good    Pain  Current pain ratin  At best pain ratin  At worst pain ratin  Quality: dull ache, tight, sharp, radiating and cramping    Patient Goals  Patient goals for therapy: increased strength, decreased pain, improved balance, increased motion, return to work, independence with ADLs/IADLs and return to sport/leisure activities         Objective   See Exercise, Manual, and Modality Logs for complete treatment.      Assessment & Plan     Assessment  Impairments: abnormal gait, abnormal or restricted ROM, activity intolerance, impaired balance, impaired physical strength, pain with function and safety issue  Assessment details: Misty Pastrana is a 40 y.o. year-old female referred to physical therapy for cervical/ thoracic pain/ (L) upper trapezius strain. She presents with a stable clinical presentation.  She has comorbidities of anxiety and no personal factors that may affect her progress in the plan of care.  Pt has been seen in PT and is progressing well with all intervention provided secondary to minimal pain this session.  Pt notes 6-7 hr sleep tolerance, decrease onset of headaches by 75%. Increase cervical/ thoracic AROM WFL.  Increase  cervical thoracic postural strength to 4/5 at 90 degrees. Mild ttp (B) UT.  PT emphasis specifically to postural strengthening.  Signs and symptoms are consistent with physical therapy diagnosis of chronically acute cervical/ thoracic pain/ strain  Prognosis: good  Functional Limitations: walking, uncomfortable because of pain, standing and stooping      Prognosis: good  Prognosis details: Plan Goals: stg 6 wks    Pt to be educated/ independent with initial HEP specific to precautions/ restrictions/ correct sitting posture. met  Decreased (L) UT/ serratus anterior ttp from severe to minimal to allow for 6 hours continuous sleep.met    Increased cervical AROM in rotation/ sB  (R)= (L) to allow for increased ease w dressing/ grooming activities.met    ltg 12 wks    Increase cervical/ thoracic postural strength 4/5 at 90 degrees to allow for increased ease with reaching/ light/ caring for son without evidence of pain > 2/10 consistently. ongoing    Pt to deny radicular pain (B) UE consistently to allow for increased ease with dressing/ grooming/ reaching activities. met    Improve oswestry score from 54% to 34% met  Functional Limitations: carrying objects, lifting, sleeping, walking, pushing, uncomfortable because of pain, standing, stooping, reaching overhead and unable to perform repetitive tasks  Plan  Therapy options: will be seen for skilled physical therapy services  Planned modality interventions: cryotherapy, electrical stimulation/Russian stimulation, TENS, ultrasound and thermotherapy (hydrocollator packs)  Planned therapy interventions: abdominal trunk stabilization, manual therapy, motor coordination training, neuromuscular re-education, balance/weight-bearing training, ADL retraining, flexibility, functional ROM exercises, gait training, home exercise program, joint mobilization, transfer training, therapeutic activities, strengthening, stretching and soft tissue mobilization  Frequency: 1x week  Duration  in weeks: 12  Treatment plan discussed with: patient           Recommendations: Continue as planned  Timeframe: 1 month  Prognosis to achieve goals: good    PT Signature: Ck Pennington, PT      Based upon review of the patient's progress and continued therapy plan, it is my medical opinion that Misty Pastrana should continue physical therapy treatment at [facility]  [address] [place] [address] [address] [place] [address]  [phone].    Signature: __________________________________  Jacques Turner APRN    Timed:  Manual Therapy:         mins  73883;  Therapeutic Exercise:    10     mins  59607;     Neuromuscular Melissa:        mins  13050;    Therapeutic Activity:   20      mins  78556;     Gait Training:           mins  74337;     Ultrasound:          mins  54704;    Electrical Stimulation:         mins  97985 ( );  Iontophoresis         mins 07536;  Dry Needling                   mins    Untimed:  Electrical Stimulation:         mins  12934 ( );  Mechanical Traction:         mins  75012;     Timed Treatment: 30    mins   Total Treatment:   30     mins

## 2021-03-10 ENCOUNTER — TREATMENT (OUTPATIENT)
Dept: PHYSICAL THERAPY | Facility: CLINIC | Age: 41
End: 2021-03-10

## 2021-03-10 DIAGNOSIS — M62.9 MUSCULOSKELETAL DISORDER INVOLVING UPPER TRAPEZIUS MUSCLE: ICD-10-CM

## 2021-03-10 DIAGNOSIS — M54.2 CERVICAL PAIN: Primary | ICD-10-CM

## 2021-03-10 PROCEDURE — 97110 THERAPEUTIC EXERCISES: CPT | Performed by: PHYSICAL THERAPIST

## 2021-03-10 PROCEDURE — 97530 THERAPEUTIC ACTIVITIES: CPT | Performed by: PHYSICAL THERAPIST

## 2021-03-10 NOTE — PROGRESS NOTES
Physical Therapy Daily Progress Note    Patient: Misty Pastrana   : 1980  Diagnosis/ICD-10 Code:  Cervical pain [M54.2]  Referring practitioner: DAREK Eugene  Date of Initial Visit: Type: THERAPY  Noted: 2020  Today's Date: 3/10/2021  Patient seen for 11 sessions           Subjective pt denies headaches. No periscapular pain.     Objective   See Exercise, Manual, and Modality Logs for complete treatment.       Assessment/Plan  Progressed with cervical/ thoracic postural strengthening no pain with pre. Improved endurance with functional exercise. Encouraged with tolerance. All stg met. Doing well today.          Timed:    Manual Therapy:         mins  32753;  Therapeutic Exercise:     16    mins  18232;     Neuromuscular Melissa:        mins  97551;    Therapeutic Activity:     20     mins  32895;     Gait Training:           mins  63233;     Ultrasound:          mins  66338;    Electrical Stimulation:         mins  89846 ( );  Iontophoresis         mins 85185;  Aquatic Therapy         mins 95662;  Dry Needling                   mins    Untimed:  Electrical Stimulation:         mins  73810 ( );  Mechanical Traction:         mins  59822;     Timed Treatment:     36 mins   Total Treatment:      36  mins  Ck Pennington, PT  Physical Therapist

## 2021-03-15 ENCOUNTER — APPOINTMENT (OUTPATIENT)
Dept: PREADMISSION TESTING | Facility: HOSPITAL | Age: 41
End: 2021-03-15

## 2021-03-15 VITALS
HEIGHT: 64 IN | DIASTOLIC BLOOD PRESSURE: 84 MMHG | HEART RATE: 78 BPM | BODY MASS INDEX: 21.95 KG/M2 | WEIGHT: 128.6 LBS | SYSTOLIC BLOOD PRESSURE: 126 MMHG | OXYGEN SATURATION: 98 % | RESPIRATION RATE: 16 BRPM

## 2021-03-15 LAB
HCG SERPL QL: NEGATIVE
SARS-COV-2 RNA PNL SPEC NAA+PROBE: NOT DETECTED

## 2021-03-15 PROCEDURE — 87635 SARS-COV-2 COVID-19 AMP PRB: CPT | Performed by: OBSTETRICS & GYNECOLOGY

## 2021-03-15 PROCEDURE — C9803 HOPD COVID-19 SPEC COLLECT: HCPCS

## 2021-03-15 PROCEDURE — 85025 COMPLETE CBC W/AUTO DIFF WBC: CPT | Performed by: OBSTETRICS & GYNECOLOGY

## 2021-03-15 PROCEDURE — 84703 CHORIONIC GONADOTROPIN ASSAY: CPT | Performed by: OBSTETRICS & GYNECOLOGY

## 2021-03-15 RX ORDER — MULTIPLE VITAMINS W/ MINERALS TAB 9MG-400MCG
1 TAB ORAL DAILY
COMMUNITY

## 2021-03-15 NOTE — DISCHARGE INSTRUCTIONS
PRE-ADMISSION TESTING INSTRUCTIONS FOR ADULTS    Take these medications the morning of surgery with a small sip of water:  zoloft      No aspirin, advil, aleve, ibuprofen, naproxen, diet pills, decongestants, or herbal/vitamins for a week prior to surgery.    General Instructions:    • Do not eat solid food after midnight the night before surgery.  No gum, mints, or hard candy after midnight the night before surgery.  • You may drink clear liquids the day of surgery up until 2 hours before your arrival time.  (5:15 am)  • Clear liquids are liquids you can see through. Nothing RED in color.    Plain water    Sports drinks  Sodas     Gelatin (Jell-O)  Fruit juices without pulp such as white grape juice and apple juice  Popsicles that contain no fruit or yogurt  Tea or coffee (no cream or milk added)    • It is beneficial for you to have a clear drink that contains carbohydrates just before you leave your house and before your fasting time begins.  We suggest a 20 ounce bottle of Gatorade or Powerade for non-diabetic patients or a 20 ounce bottle of G2 or Powerade Zero for diabetic patients.  (5:15 am)    • Patients who avoid smoking, chewing tobacco and alcohol for 4 weeks prior to surgery have a reduced risk of post-operative complications.  If at all possible, quit smoking as many days before surgery as you can.    • Do not smoke, use chewing tobacco or drink alcohol the day of surgery    • Bring your C-PAP/ BI-PAP machine if you use one.  • Wear clean comfortable clothes and socks.  • Do not wear contact lenses, lotion, deodorant, or make-up.  Bring a case for your glasses if applicable. You may brush your teeth the morning of surgery.  • You may wear dentures/partials, do not put adhesive/glue on them.  • Bring crutches or walker if applicable.  • Leave all other jewelry and valuables at home.      Preventing a Surgical Site Infection:    • Shower the night before and on the morning of surgery using the  chlorhexidine soap you were given.  Use a clean washcloth with the soap.  Place clean sheets on your bed after showering the night before surgery. Do not use the CHG soap on your hair, face, or private areas. Wash your body gently for five (5) minutes. Do not scrub your skin.  Dry with a clean towel and dress in clean clothing.    • Do not shave the surgical area for 10 days-2 weeks prior to surgery  because the razor can irritate skin and make it easier to develop an infection.  • Make sure you, your family, and all healthcare providers clean their hands with soap and water or an alcohol based hand  before caring for you or your wound.      Day of surgery:    Your surgeon’s office will advise you of your arrival time for the day of surgery.    Upon arrival, a Pre-op nurse and Anesthesia provider will review your health history, obtain vital signs, and answer questions you may have.  The only belongings needed at this time will be your home medications and if applicable your C-PAP/BI-PAP machine.  If you are staying overnight your family can leave the rest of your belongings in the car and bring them to your room later.  A Pre-op nurse will start an IV and you may receive medication in preparation for surgery, including something to help you relax.  Your family will be able to see you in the Pre-op area.  While you are in surgery your family should notify the waiting room  if they leave the waiting room area and provide a contact phone number.    IF you have any questions, you can call the Pre-Admission Department at [phone] or your surgeon's office.  Notify your surgeon if  you become sick, have a fever, productive cough, or cannot be here the day of surgery    Please be aware that surgery does come with discomfort.  We want to make every effort to control your discomfort so please discuss any uncontrolled symptoms with your nurse.   Your doctor will most likely have prescribed pain  medications.      If you are going home after surgery, you will receive individualized written care instructions before being discharged.  A responsible adult (over the age of 18) must drive you to and from the hospital on the day of your surgery and stay with you for 24 hours after anesthesia.    If you are staying overnight following surgery, you will be transported to your hospital room following the recovery period.  [facility] has all private rooms.    You may receive a survey regarding the care you received. Your feedback is very important and will be used to collect the necessary data to help us to continue to provide excellent care.     Deductibles and co-payments are collected on the day of service. Please be prepared to pay the required co-pay, deductible or deposit on the day of service as defined by your plan.

## 2021-03-15 NOTE — PAT
Pt here for PAT visit.  Pre-op tests completed, chg soap given, and instructions reviewed.  Instructed clears until 5:15 am, voiced understanding. COVID test pending.

## 2021-03-17 ENCOUNTER — ANESTHESIA EVENT (OUTPATIENT)
Dept: PERIOP | Facility: HOSPITAL | Age: 41
End: 2021-03-17

## 2021-03-17 ENCOUNTER — TREATMENT (OUTPATIENT)
Dept: PHYSICAL THERAPY | Facility: CLINIC | Age: 41
End: 2021-03-17

## 2021-03-17 DIAGNOSIS — M54.6 ACUTE LEFT-SIDED THORACIC BACK PAIN: ICD-10-CM

## 2021-03-17 DIAGNOSIS — M54.2 CERVICAL PAIN: Primary | ICD-10-CM

## 2021-03-17 DIAGNOSIS — M62.9 MUSCULOSKELETAL DISORDER INVOLVING UPPER TRAPEZIUS MUSCLE: ICD-10-CM

## 2021-03-17 PROBLEM — Z98.891 HISTORY OF CESAREAN DELIVERY: Status: ACTIVE | Noted: 2021-03-17

## 2021-03-17 PROCEDURE — 97110 THERAPEUTIC EXERCISES: CPT | Performed by: PHYSICAL THERAPIST

## 2021-03-17 PROCEDURE — 97530 THERAPEUTIC ACTIVITIES: CPT | Performed by: PHYSICAL THERAPIST

## 2021-03-17 NOTE — H&P
PREOPERATIVE HISTORY AND PHYSICAL      Patient Care Team:  Priyanka Cortes APRN as PCP - General (Nurse Practitioner)    Chief complaint: Request for sterilization    Pt is a 40 y.o. No obstetric history on file.  Patient's last menstrual period was 2021 (approximate).     HPI:Pt desires permanent sterilization.      PMHx:   Past Medical History:   Diagnosis Date   • H. pylori infection    • Upper back pain     cervical thoracic pain, in PT for       Current problem list:  Patient Active Problem List   Diagnosis   • Request for sterilization       PSHx:   Past Surgical History:   Procedure Laterality Date   • BREAST FIBROADENOMA SURGERY Left    •  SECTION         Social Hx:   Social History     Socioeconomic History   • Marital status:      Spouse name: Not on file   • Number of children: Not on file   • Years of education: Not on file   • Highest education level: Not on file   Tobacco Use   • Smoking status: Never Smoker   • Smokeless tobacco: Never Used   Vaping Use   • Vaping Use: Never used   Substance and Sexual Activity   • Alcohol use: Yes     Comment: rarely   • Drug use: Never   • Sexual activity: Defer       FHx:   Family History   Problem Relation Age of Onset   • Breast cancer Mother 55   • Diabetes Father    • Malig Hyperthermia Neg Hx        Debilities/Disabilities Identified: None    Emotional Behavior: Appropriate    PGyn Hx:  otherwise noncontributory    POBHx:   OB History   No obstetric history on file.       Allergies: Zolpidem tartrate and Neomycin-bacitracin zn-polymyx    Medications:   No medications prior to admission.                            No current facility-administered medications for this encounter.    Current Outpatient Medications:   •  baclofen (LIORESAL) 10 MG tablet, Take 10 mg by mouth 4 (Four) Times a Day As Needed for Muscle Spasms., Disp: , Rfl:   •  ibuprofen (ADVIL,MOTRIN) 400 MG tablet, Take 400 mg by mouth Every 6 (Six) Hours As  Needed for Mild Pain ., Disp: , Rfl:   •  multivitamin with minerals tablet tablet, Take 1 tablet by mouth Daily., Disp: , Rfl:   •  naproxen (NAPROSYN) 500 MG tablet, Take 500 mg by mouth 2 (Two) Times a Day As Needed for Mild Pain ., Disp: , Rfl:   •  sertraline (ZOLOFT) 50 MG tablet, Take 50 mg by mouth Daily., Disp: , Rfl:         Review of Systems   Constitutional: Negative.    HENT: Negative.    Eyes: Negative.    Respiratory: Negative.    Cardiovascular: Negative.    Gastrointestinal: Negative.    Endocrine: Negative.    Musculoskeletal: Negative.    Skin: Negative.    Allergic/Immunologic: Negative.    Neurological: Negative.    Hematological: Negative.    Psychiatric/Behavioral: Negative.        Vital Signs  LMP 02/26/2021 (Approximate)     Physical Exam  Vitals and nursing note reviewed.   Constitutional:       Appearance: She is well-developed.   HENT:      Head: Normocephalic and atraumatic.   Cardiovascular:      Rate and Rhythm: Normal rate.   Pulmonary:      Effort: Pulmonary effort is normal.   Abdominal:      General: There is no distension.      Palpations: Abdomen is soft. There is no mass.      Tenderness: There is no abdominal tenderness. There is no guarding.   Genitourinary:     Vagina: No vaginal discharge.   Musculoskeletal:         General: No tenderness or deformity. Normal range of motion.      Cervical back: Normal range of motion.   Skin:     General: Skin is warm and dry.      Coloration: Skin is not pale.      Findings: No erythema or rash.   Neurological:      Mental Status: She is alert and oriented to person, place, and time.   Psychiatric:         Behavior: Behavior normal.         Thought Content: Thought content normal.         Judgment: Judgment normal.             IMPRESSION:    Request for sterilization                                    PLAN:    Procedure(s):  BILATERAL LAPAROSCOPIC SALPINGECTOMY FOR STERILIZATION    RISKS, ALTERNATIVES, COMPLICATIONS OF THE PROCEDURE  INCLUDING BUT NOT LIMITED TO:    INTRAOPERATIVE RISKS: INJURY TO INTERNAL ORGANS (BOWEL, BLADDER, URETER,BLOOD VESSELS) OR HEMORRHAGE REQUIRING FURTHER SURGERY, INFECTION, AND DEATH;   POSTOP COMPLICATIONS: BLEEDING, INFECTION, PNEUMONIA, PULMONARY EMBOLISM, AND DEATH;   WERE EXPLAINED TO THE PT WHO VERBALIZED HER UNDERSTANDING.        I discussed the patients findings and my recommendations with patient.     Bronson Sanford MD  03/17/21  17:15 EDT

## 2021-03-18 ENCOUNTER — HOSPITAL ENCOUNTER (OUTPATIENT)
Facility: HOSPITAL | Age: 41
Setting detail: HOSPITAL OUTPATIENT SURGERY
Discharge: HOME | End: 2021-03-18
Attending: OBSTETRICS & GYNECOLOGY | Admitting: OBSTETRICS & GYNECOLOGY

## 2021-03-18 ENCOUNTER — ANESTHESIA (OUTPATIENT)
Dept: PERIOP | Facility: HOSPITAL | Age: 41
End: 2021-03-18

## 2021-03-18 VITALS
DIASTOLIC BLOOD PRESSURE: 69 MMHG | TEMPERATURE: 98.4 F | OXYGEN SATURATION: 98 % | RESPIRATION RATE: 16 BRPM | HEART RATE: 56 BPM | SYSTOLIC BLOOD PRESSURE: 102 MMHG | WEIGHT: 124.4 LBS | BODY MASS INDEX: 21.35 KG/M2

## 2021-03-18 DIAGNOSIS — Z90.79 HISTORY OF BILATERAL SALPINGECTOMY: Primary | ICD-10-CM

## 2021-03-18 DIAGNOSIS — Z30.2 REQUEST FOR STERILIZATION: ICD-10-CM

## 2021-03-18 PROCEDURE — 25010000002 MIDAZOLAM PER 1MG: Performed by: NURSE ANESTHETIST, CERTIFIED REGISTERED

## 2021-03-18 PROCEDURE — 25010000002 ONDANSETRON PER 1 MG: Performed by: NURSE ANESTHETIST, CERTIFIED REGISTERED

## 2021-03-18 PROCEDURE — 58661 LAPAROSCOPY REMOVE ADNEXA: CPT | Performed by: OBSTETRICS & GYNECOLOGY

## 2021-03-18 PROCEDURE — 25010000002 FENTANYL CITRATE (PF) 100 MCG/2ML SOLUTION: Performed by: ANESTHESIOLOGY

## 2021-03-18 PROCEDURE — 25010000002 KETOROLAC TROMETHAMINE PER 15 MG: Performed by: ANESTHESIOLOGY

## 2021-03-18 PROCEDURE — 58661 LAPAROSCOPY REMOVE ADNEXA: CPT | Performed by: SPECIALIST/TECHNOLOGIST, OTHER

## 2021-03-18 PROCEDURE — 88302 TISSUE EXAM BY PATHOLOGIST: CPT | Performed by: OBSTETRICS & GYNECOLOGY

## 2021-03-18 PROCEDURE — 25010000002 NEOSTIGMINE 10 MG/10ML SOLUTION: Performed by: ANESTHESIOLOGY

## 2021-03-18 PROCEDURE — 25010000002 HYDROMORPHONE 1 MG/ML SOLUTION: Performed by: ANESTHESIOLOGY

## 2021-03-18 PROCEDURE — 25010000002 DEXAMETHASONE PER 1 MG: Performed by: NURSE ANESTHETIST, CERTIFIED REGISTERED

## 2021-03-18 PROCEDURE — 25010000002 PROPOFOL 10 MG/ML EMULSION: Performed by: ANESTHESIOLOGY

## 2021-03-18 RX ORDER — SODIUM CHLORIDE 9 MG/ML
40 INJECTION, SOLUTION INTRAVENOUS AS NEEDED
Status: DISCONTINUED | OUTPATIENT
Start: 2021-03-18 | End: 2021-03-18 | Stop reason: HOSPADM

## 2021-03-18 RX ORDER — ONDANSETRON 2 MG/ML
4 INJECTION INTRAMUSCULAR; INTRAVENOUS ONCE AS NEEDED
Status: COMPLETED | OUTPATIENT
Start: 2021-03-18 | End: 2021-03-18

## 2021-03-18 RX ORDER — KETAMINE HYDROCHLORIDE 10 MG/ML
INJECTION INTRAMUSCULAR; INTRAVENOUS AS NEEDED
Status: DISCONTINUED | OUTPATIENT
Start: 2021-03-18 | End: 2021-03-18 | Stop reason: SURG

## 2021-03-18 RX ORDER — FAMOTIDINE 10 MG/ML
20 INJECTION, SOLUTION INTRAVENOUS
Status: COMPLETED | OUTPATIENT
Start: 2021-03-18 | End: 2021-03-18

## 2021-03-18 RX ORDER — FENTANYL CITRATE 50 UG/ML
INJECTION, SOLUTION INTRAMUSCULAR; INTRAVENOUS AS NEEDED
Status: DISCONTINUED | OUTPATIENT
Start: 2021-03-18 | End: 2021-03-18 | Stop reason: SURG

## 2021-03-18 RX ORDER — ACETAMINOPHEN 500 MG
1000 TABLET ORAL ONCE
Status: COMPLETED | OUTPATIENT
Start: 2021-03-18 | End: 2021-03-18

## 2021-03-18 RX ORDER — SODIUM CHLORIDE 0.9 % (FLUSH) 0.9 %
10 SYRINGE (ML) INJECTION EVERY 12 HOURS SCHEDULED
Status: DISCONTINUED | OUTPATIENT
Start: 2021-03-18 | End: 2021-03-18 | Stop reason: HOSPADM

## 2021-03-18 RX ORDER — MIDAZOLAM HYDROCHLORIDE 2 MG/2ML
2 INJECTION, SOLUTION INTRAMUSCULAR; INTRAVENOUS
Status: DISCONTINUED | OUTPATIENT
Start: 2021-03-18 | End: 2021-03-18 | Stop reason: HOSPADM

## 2021-03-18 RX ORDER — SODIUM CHLORIDE, SODIUM LACTATE, POTASSIUM CHLORIDE, CALCIUM CHLORIDE 600; 310; 30; 20 MG/100ML; MG/100ML; MG/100ML; MG/100ML
9 INJECTION, SOLUTION INTRAVENOUS CONTINUOUS
Status: DISCONTINUED | OUTPATIENT
Start: 2021-03-18 | End: 2021-03-18 | Stop reason: HOSPADM

## 2021-03-18 RX ORDER — KETOROLAC TROMETHAMINE 30 MG/ML
INJECTION, SOLUTION INTRAMUSCULAR; INTRAVENOUS AS NEEDED
Status: DISCONTINUED | OUTPATIENT
Start: 2021-03-18 | End: 2021-03-18 | Stop reason: SURG

## 2021-03-18 RX ORDER — SODIUM CHLORIDE 0.9 % (FLUSH) 0.9 %
3 SYRINGE (ML) INJECTION EVERY 12 HOURS SCHEDULED
Status: DISCONTINUED | OUTPATIENT
Start: 2021-03-18 | End: 2021-03-18 | Stop reason: HOSPADM

## 2021-03-18 RX ORDER — DEXAMETHASONE SODIUM PHOSPHATE 4 MG/ML
8 INJECTION, SOLUTION INTRA-ARTICULAR; INTRALESIONAL; INTRAMUSCULAR; INTRAVENOUS; SOFT TISSUE ONCE
Status: COMPLETED | OUTPATIENT
Start: 2021-03-18 | End: 2021-03-18

## 2021-03-18 RX ORDER — SODIUM CHLORIDE, SODIUM LACTATE, POTASSIUM CHLORIDE, CALCIUM CHLORIDE 600; 310; 30; 20 MG/100ML; MG/100ML; MG/100ML; MG/100ML
100 INJECTION, SOLUTION INTRAVENOUS CONTINUOUS
Status: DISCONTINUED | OUTPATIENT
Start: 2021-03-18 | End: 2021-03-18 | Stop reason: HOSPADM

## 2021-03-18 RX ORDER — NEOSTIGMINE METHYLSULFATE 1 MG/ML
INJECTION, SOLUTION INTRAVENOUS AS NEEDED
Status: DISCONTINUED | OUTPATIENT
Start: 2021-03-18 | End: 2021-03-18 | Stop reason: SURG

## 2021-03-18 RX ORDER — GLYCOPYRROLATE 0.2 MG/ML
INJECTION INTRAMUSCULAR; INTRAVENOUS AS NEEDED
Status: DISCONTINUED | OUTPATIENT
Start: 2021-03-18 | End: 2021-03-18 | Stop reason: SURG

## 2021-03-18 RX ORDER — SCOLOPAMINE TRANSDERMAL SYSTEM 1 MG/1
1 PATCH, EXTENDED RELEASE TRANSDERMAL CONTINUOUS
Status: DISCONTINUED | OUTPATIENT
Start: 2021-03-18 | End: 2021-03-18 | Stop reason: HOSPADM

## 2021-03-18 RX ORDER — OXYCODONE HYDROCHLORIDE AND ACETAMINOPHEN 5; 325 MG/1; MG/1
1 TABLET ORAL ONCE AS NEEDED
Status: COMPLETED | OUTPATIENT
Start: 2021-03-18 | End: 2021-03-18

## 2021-03-18 RX ORDER — LIDOCAINE HYDROCHLORIDE 20 MG/ML
INJECTION, SOLUTION INFILTRATION; PERINEURAL AS NEEDED
Status: DISCONTINUED | OUTPATIENT
Start: 2021-03-18 | End: 2021-03-18 | Stop reason: SURG

## 2021-03-18 RX ORDER — MIDAZOLAM HYDROCHLORIDE 2 MG/2ML
1 INJECTION, SOLUTION INTRAMUSCULAR; INTRAVENOUS
Status: DISCONTINUED | OUTPATIENT
Start: 2021-03-18 | End: 2021-03-18 | Stop reason: HOSPADM

## 2021-03-18 RX ORDER — MAGNESIUM HYDROXIDE 1200 MG/15ML
LIQUID ORAL AS NEEDED
Status: DISCONTINUED | OUTPATIENT
Start: 2021-03-18 | End: 2021-03-18 | Stop reason: HOSPADM

## 2021-03-18 RX ORDER — PROPOFOL 10 MG/ML
VIAL (ML) INTRAVENOUS AS NEEDED
Status: DISCONTINUED | OUTPATIENT
Start: 2021-03-18 | End: 2021-03-18 | Stop reason: SURG

## 2021-03-18 RX ORDER — LIDOCAINE HYDROCHLORIDE 10 MG/ML
0.5 INJECTION, SOLUTION EPIDURAL; INFILTRATION; INTRACAUDAL; PERINEURAL ONCE AS NEEDED
Status: COMPLETED | OUTPATIENT
Start: 2021-03-18 | End: 2021-03-18

## 2021-03-18 RX ORDER — ROCURONIUM BROMIDE 10 MG/ML
INJECTION, SOLUTION INTRAVENOUS AS NEEDED
Status: DISCONTINUED | OUTPATIENT
Start: 2021-03-18 | End: 2021-03-18 | Stop reason: SURG

## 2021-03-18 RX ORDER — ONDANSETRON 2 MG/ML
4 INJECTION INTRAMUSCULAR; INTRAVENOUS ONCE AS NEEDED
Status: DISCONTINUED | OUTPATIENT
Start: 2021-03-18 | End: 2021-03-18 | Stop reason: HOSPADM

## 2021-03-18 RX ORDER — SODIUM CHLORIDE 0.9 % (FLUSH) 0.9 %
10 SYRINGE (ML) INJECTION AS NEEDED
Status: DISCONTINUED | OUTPATIENT
Start: 2021-03-18 | End: 2021-03-18 | Stop reason: HOSPADM

## 2021-03-18 RX ORDER — OXYCODONE HYDROCHLORIDE AND ACETAMINOPHEN 5; 325 MG/1; MG/1
2 TABLET ORAL EVERY 4 HOURS PRN
Qty: 10 TABLET | Refills: 0 | Status: SHIPPED | OUTPATIENT
Start: 2021-03-18 | End: 2021-03-28

## 2021-03-18 RX ADMIN — KETOROLAC TROMETHAMINE 30 MG: 30 INJECTION, SOLUTION INTRAMUSCULAR; INTRAVENOUS at 09:35

## 2021-03-18 RX ADMIN — LIDOCAINE HYDROCHLORIDE 0.5 ML: 10 INJECTION, SOLUTION EPIDURAL; INFILTRATION; INTRACAUDAL; PERINEURAL at 08:18

## 2021-03-18 RX ADMIN — SODIUM CHLORIDE, POTASSIUM CHLORIDE, SODIUM LACTATE AND CALCIUM CHLORIDE 9 ML/HR: 600; 310; 30; 20 INJECTION, SOLUTION INTRAVENOUS at 08:15

## 2021-03-18 RX ADMIN — LIDOCAINE HYDROCHLORIDE 80 MG: 20 INJECTION, SOLUTION INFILTRATION; PERINEURAL at 09:11

## 2021-03-18 RX ADMIN — KETAMINE HYDROCHLORIDE 30 MCG: 10 INJECTION, SOLUTION INTRAMUSCULAR; INTRAVENOUS at 09:15

## 2021-03-18 RX ADMIN — HYDROMORPHONE HYDROCHLORIDE 1 MG: 1 INJECTION, SOLUTION INTRAMUSCULAR; INTRAVENOUS; SUBCUTANEOUS at 10:28

## 2021-03-18 RX ADMIN — ROCURONIUM BROMIDE 5 MG: 10 INJECTION INTRAVENOUS at 09:07

## 2021-03-18 RX ADMIN — FAMOTIDINE 20 MG: 10 INJECTION, SOLUTION INTRAVENOUS at 08:18

## 2021-03-18 RX ADMIN — PROPOFOL 150 MG: 10 INJECTION, EMULSION INTRAVENOUS at 09:09

## 2021-03-18 RX ADMIN — NEOSTIGMINE METHYLSULFATE 2 MG: 1 INJECTION INTRAVENOUS at 09:51

## 2021-03-18 RX ADMIN — MIDAZOLAM HYDROCHLORIDE 1 MG: 1 INJECTION, SOLUTION INTRAMUSCULAR; INTRAVENOUS at 08:36

## 2021-03-18 RX ADMIN — FENTANYL CITRATE 50 MCG: 50 INJECTION INTRAMUSCULAR; INTRAVENOUS at 10:14

## 2021-03-18 RX ADMIN — DEXAMETHASONE SODIUM PHOSPHATE 8 MG: 4 INJECTION, SOLUTION INTRAMUSCULAR; INTRAVENOUS at 08:18

## 2021-03-18 RX ADMIN — OXYCODONE HYDROCHLORIDE AND ACETAMINOPHEN 1 TABLET: 5; 325 TABLET ORAL at 11:02

## 2021-03-18 RX ADMIN — FENTANYL CITRATE 25 MCG: 50 INJECTION INTRAMUSCULAR; INTRAVENOUS at 09:11

## 2021-03-18 RX ADMIN — FENTANYL CITRATE 25 MCG: 50 INJECTION INTRAMUSCULAR; INTRAVENOUS at 09:18

## 2021-03-18 RX ADMIN — GLYCOPYRROLATE 0.1 MG: 0.2 INJECTION INTRAMUSCULAR; INTRAVENOUS at 09:06

## 2021-03-18 RX ADMIN — ACETAMINOPHEN 1000 MG: 500 TABLET ORAL at 08:17

## 2021-03-18 RX ADMIN — ONDANSETRON 4 MG: 2 INJECTION INTRAMUSCULAR; INTRAVENOUS at 08:18

## 2021-03-18 RX ADMIN — HYDROMORPHONE HYDROCHLORIDE 1 MG: 1 INJECTION, SOLUTION INTRAMUSCULAR; INTRAVENOUS; SUBCUTANEOUS at 10:39

## 2021-03-18 RX ADMIN — SCOPALAMINE 1 PATCH: 1 PATCH, EXTENDED RELEASE TRANSDERMAL at 08:20

## 2021-03-18 RX ADMIN — GLYCOPYRROLATE 0.2 MG: 0.2 INJECTION INTRAMUSCULAR; INTRAVENOUS at 09:51

## 2021-03-18 RX ADMIN — ROCURONIUM BROMIDE 20 MG: 10 INJECTION INTRAVENOUS at 09:09

## 2021-03-18 NOTE — ANESTHESIA PROCEDURE NOTES
Airway  Urgency: elective    Date/Time: 3/18/2021 9:14 AM  Airway not difficult    General Information and Staff    Patient location during procedure: OR  Anesthesiologist: Isabella Castro MD    Indications and Patient Condition  Indications for airway management: airway protection    Preoxygenated: yes  MILS maintained throughout  Mask difficulty assessment: 1 - vent by mask    Final Airway Details  Final airway type: endotracheal airway      Successful airway: ETT  Cuffed: yes   Successful intubation technique: direct laryngoscopy  Facilitating devices/methods: intubating stylet  Endotracheal tube insertion site: oral  Blade: Berrios  Blade size: 2  ETT size (mm): 7.5  Cormack-Lehane Classification: grade IIa - partial view of glottis  Placement verified by: chest auscultation and capnometry   Cuff volume (mL): 5  Measured from: lips  ETT/EBT  to lips (cm): 21  Number of attempts at approach: 1  Assessment: lips, teeth, and gum same as pre-op and atraumatic intubation

## 2021-03-18 NOTE — PROGRESS NOTES
Physical Therapy Daily Progress Note    Patient: Misty Pastrana   : 1980  Diagnosis/ICD-10 Code:  Cervical pain [M54.2]  Referring practitioner: DAREK Eugene  Date of Initial Visit: Type: THERAPY  Noted: 2020  Today's Date: 3/18/2021  Patient seen for 12 sessions           Subjective 75% improved. No headaches. Mild pain today 1/ 10    Objective   See Exercise, Manual, and Modality Logs for complete treatment.       Assessment/Plan    Full cervical/ thoracic AROM; no headaches. Increased postural strengthening at 90 degrees /5;  All stg met..  Mild cuing with HEP. Progressing towards all goals. Pt satisfied with a ll intervention provided.        Timed:    Manual Therapy:        mins  75032;  Therapeutic Exercise:    15     mins  15118;     Neuromuscular Melissa:        mins  85773;    Therapeutic Activity:      25Gait Training:           mins  37440;     Ultrasound:          mins  61896;    Electrical Stimulation:         mins  05963 ( );  Iontophoresis         mins 58254;  Aquatic Therapy         mins 38242;  Dry Needling                   mins    Untimed:  Electrical Stimulation:         mins  36268 ( );  Mechanical Traction:         mins  85253;     Timed Treatment:   40   mins   Total Treatment:     40   mins  Ck Pennington, PT  Physical Therapist

## 2021-03-18 NOTE — OP NOTE
OPERATIVE REPORT    PROCEDURE:  LAPAROSCOPIC BILATERAL SALPINGECTOMY FOR STERILIZATION, lysis of adhesions    PREOP DX:  Pt desires permanent sterilization    POSTOP DX:  same    SURGEON:  Bronson Sanford MD    ASSISTANT:  Milo Palafox CSA, responsible for suturing, retracting, retrieval of specimen and closing; present throughout the entire case.    ANESTHESIA:  GETA    FINDINGS:  Normal pelvis and abdomen    COMPLICATIONS:  none    EBL:  1cc    IVFS:  750cc    URINE OUTPUT:  250cc    SPECIMENS: Left & Right fallopian tubes.      DESCRIPTION OF THE PROCEDURE:    After GETA had been induced and time out done, pt was placed in the dorso-lithotomy position and prepped and draped.  No antibiotics were given.    A sponge stick was placed in the vagina.      An infraumbilical 5mm port was placed without incident and the abdomen was insufflated with CO2.  Accessory ports were placed:  8mm in the RLQ, 5mm in the LLQ.      Pelvic and abdominal cavities were seen in their entirety.  There was a small band of adhesions from the anterior surface of the uterus to the anterior abdominal wall, presumably from her prior C/S, but it was small and did not interfere with visualization or mobility of the tubes.  There were also some filmy adhesions around the left tube which were taken down with the ENSEAL. Otherwise there were no other abnormalities.      Each fallopian tube was identified, traced out to the fimbriated end, and removed with the enseal intact.  The stumps were coagulated in 3 contiguous areas ensuring complete occlusion. Both occlusions appeared to be adequate for permanent sterilization.  The left tube did have a couple of paratubal hydatids of morgagni present that were removed with the tube.     All instruments were removed and the incisions were reapproximated with 4-0 monocryl in a subcuticular continuous fashion.      Pt tolerate procedure well and went to the  in satis condition.    There were no  apparent complications    All sponge, instrument and needle counts were correct x 3 according to the OR personnel.    Bronson Sanford MD  10:02 EDT  @today@

## 2021-03-18 NOTE — ANESTHESIA PREPROCEDURE EVALUATION
Anesthesia Evaluation     Patient summary reviewed and Nursing notes reviewed   no history of anesthetic complications:  NPO Solid Status: > 8 hours  NPO Liquid Status: > 6 hours           Airway   Mallampati: II  TM distance: >3 FB  Neck ROM: full  No difficulty expected  Dental      Pulmonary - negative pulmonary ROS    breath sounds clear to auscultation  Cardiovascular - negative cardio ROS  Exercise tolerance: good (4-7 METS)    Rhythm: regular  Rate: normal        Neuro/Psych- negative ROS  GI/Hepatic/Renal/Endo - negative ROS     Musculoskeletal     (+) neck pain,       ROS comment: Upper back and neck pain muscular seeing physical therapy   Abdominal    Substance History - negative use     OB/GYN          Other - negative ROS                       Anesthesia Plan    ASA 2     general     intravenous induction     Anesthetic plan, all risks, benefits, and alternatives have been provided, discussed and informed consent has been obtained with: patient.  Use of blood products discussed with patient  Consented to blood products.

## 2021-03-18 NOTE — ANESTHESIA POSTPROCEDURE EVALUATION
Patient: Misty Pastrana    Procedure Summary     Date: 03/18/21 Room / Location:  [facility] 2 /  [facility]    Anesthesia Start: 0904 Anesthesia Stop: 1006    Procedure: BILATERAL LAPAROSCOPIC SALPINGECTOMY FOR STERILIZATION (Bilateral Abdomen) Diagnosis:       Request for sterilization      (Request for sterilization [Z30.2])    Surgeons: Bronson Sanford MD Provider: Isabella Castro MD    Anesthesia Type: general ASA Status: 2          Anesthesia Type: general    Vitals  Vitals Value Taken Time   /67 03/18/21 1050   Temp 97.5 °F (36.4 °C) 03/18/21 1006   Pulse 51 03/18/21 1051   Resp 13 03/18/21 1050   SpO2 98 % 03/18/21 1052   Vitals shown include unvalidated device data.        Post Anesthesia Care and Evaluation    Patient location during evaluation: PHASE II  Patient participation: complete - patient participated  Level of consciousness: awake  Pain management: adequate  Airway patency: patent  Anesthetic complications: No anesthetic complications  PONV Status: none  Cardiovascular status: acceptable  Respiratory status: acceptable  Hydration status: acceptable

## 2021-03-18 NOTE — INTERVAL H&P NOTE
H&P reviewed. The patient was examined and there are no changes to the H&P.    /70 (BP Location: Right arm, Patient Position: Lying)   Pulse 63   Temp 98.4 °F (36.9 °C) (Oral)   Resp 18   Wt 56.4 kg (124 lb 6.4 oz)   LMP 02/26/2021 (Approximate)   SpO2 97%   BMI 21.35 kg/m²     Medications Prior to Admission   Medication Sig Dispense Refill Last Dose    ibuprofen (ADVIL,MOTRIN) 400 MG tablet Take 400 mg by mouth Every 6 (Six) Hours As Needed for Mild Pain .   Past Week at Unknown time    multivitamin with minerals tablet tablet Take 1 tablet by mouth Daily.   3/17/2021 at Unknown time    naproxen (NAPROSYN) 500 MG tablet Take 500 mg by mouth 2 (Two) Times a Day As Needed for Mild Pain .   Past Week at Unknown time    sertraline (ZOLOFT) 50 MG tablet Take 50 mg by mouth Daily.   3/17/2021 at 0800    baclofen (LIORESAL) 10 MG tablet Take 10 mg by mouth 4 (Four) Times a Day As Needed for Muscle Spasms.   3/14/2021

## 2021-03-19 LAB
LAB AP CASE REPORT: NORMAL
PATH REPORT.FINAL DX SPEC: NORMAL
PATH REPORT.GROSS SPEC: NORMAL

## 2021-03-31 ENCOUNTER — TREATMENT (OUTPATIENT)
Dept: PHYSICAL THERAPY | Facility: CLINIC | Age: 41
End: 2021-03-31

## 2021-03-31 DIAGNOSIS — M54.2 CERVICAL PAIN: Primary | ICD-10-CM

## 2021-03-31 DIAGNOSIS — M62.9 MUSCULOSKELETAL DISORDER INVOLVING UPPER TRAPEZIUS MUSCLE: ICD-10-CM

## 2021-03-31 PROCEDURE — 97530 THERAPEUTIC ACTIVITIES: CPT | Performed by: PHYSICAL THERAPIST

## 2021-03-31 PROCEDURE — 97110 THERAPEUTIC EXERCISES: CPT | Performed by: PHYSICAL THERAPIST

## 2021-03-31 NOTE — PROGRESS NOTES
Physical Therapy Daily Progress Note    Patient: Misty Pastrana   : 1980  Diagnosis/ICD-10 Code:  Cervical pain [M54.2]  Referring practitioner: DAREK Eugene  Date of Initial Visit: Type: THERAPY  Noted: 2020  Today's Date: 3/31/2021  Patient seen for 13 sessions           Subjective exacerbation of thoracic pain secondary to recent procedure causing her to be immobilized for 3-5 days.    Objective   See Exercise, Manual, and Modality Logs for complete treatment.       Assessment/Plan  Progressed with postural AROM per tolerance. No weights with movement patterns. PT emphasis AROM/ pain management.    no pain with AROM this session. Pt reports 3/ 10    Timed:    Manual Therapy:         mins  72987;  Therapeutic Exercise:  8       mins  74676;     Neuromuscular Melissa:        mins  94700;    Therapeutic Activity:    24      mins  15418;     Gait Training:           mins  81778;     Ultrasound:          mins  31316;    Electrical Stimulation:         mins  47257 ( );  Iontophoresis         mins 36428;  Aquatic Therapy         mins 85827;  Dry Needling                   mins    Untimed:  Electrical Stimulation:         mins  72718 ( );  Mechanical Traction:         mins  87192;     Timed Treatment:    32  mins   Total Treatment:     32   mins  Ck Pennington, PT  Physical Therapist

## 2021-04-08 ENCOUNTER — TREATMENT (OUTPATIENT)
Dept: PHYSICAL THERAPY | Facility: CLINIC | Age: 41
End: 2021-04-08

## 2021-04-08 DIAGNOSIS — M62.9 MUSCULOSKELETAL DISORDER INVOLVING UPPER TRAPEZIUS MUSCLE: ICD-10-CM

## 2021-04-08 DIAGNOSIS — M54.2 CERVICAL PAIN: Primary | ICD-10-CM

## 2021-04-08 PROCEDURE — 97530 THERAPEUTIC ACTIVITIES: CPT | Performed by: PHYSICAL THERAPIST

## 2021-04-08 PROCEDURE — 97110 THERAPEUTIC EXERCISES: CPT | Performed by: PHYSICAL THERAPIST

## 2021-04-11 NOTE — PROGRESS NOTES
30-Day / 10-Visit Progress Note         Patient: Misty Pastrana   : 1980  Diagnosis/ICD-10 Code:  Cervical pain [M54.2]  Referring practitioner: DAREK Eugene  Date of Initial Visit: Type: THERAPY  Noted: 2020  Today's Date: 2021  Patient seen for 14 sessions      Subjective:     Clinical Progress: improved  Home Program Compliance: Yes  Treatment has included:  therapeutic exercise    Subjective Evaluation    History of Present Illness    Subjective comment: my progress as been slow but stable.  denies headaches.  my pain is more intermittnet and mild.  i am able to do my adls without concern im donig to create this big exacerbation of pain.  encouraged. Quality of life: good    Pain  Current pain ratin  At best pain ratin  At worst pain ratin  Quality: grinding, dull ache, radiating, throbbing, tight and sharp  Aggravating factors: outstretched reach, repetitive movement, sleeping, overhead activity, movement and lifting    Patient Goals  Patient goals for therapy: increased strength, independence with ADLs/IADLs, increased motion, improved balance, decreased pain, return to sport/leisure activities and return to work         Objective     See Exercise, Manual, and Modality Logs for complete treatment.       Assessment & Plan     Assessment  Impairments: abnormal gait, abnormal or restricted ROM, impaired balance, impaired physical strength, pain with function and safety issue  Assessment details: Misty Pastrana is a 40 y.o. year-old female referred to physical therapy for cervical/ thoracic pain/ (L) upper trapezius strain. She presents with a stable clinical presentation.  She has comorbidities of anxiety and no personal factors that may affect her progress in the plan of care.  Pt has been seen in PT and is progressing well with all intervention provided secondary to minimal pain this session.  Pt notes 6-7 hr sleep tolerance, reports 1/ 10 (R) thoracic pain, decrease  onset of headaches by 80%. Increase cervical/ thoracic AROM WFL.  Increase cervical thoracic postural strength to 4/5 at 90 degrees. Mild ttp (B) UT.  PT emphasis specifically to postural strengthening. Mild cuing with HEP.  Improved confidence with reaching/ lifting activities in home.  Signs and symptoms are consistent with physical therapy diagnosis of chronically acute cervical/ thoracic pain/ strain  Prognosis: good  Functional Limitations: walking, uncomfortable because of pain, standing and stooping      Prognosis: good  Prognosis details:   Prognosis: good  Functional Limitations: carrying objects, lifting, walking, pulling, pushing, uncomfortable because of pain, standing, stooping, reaching overhead and unable to perform repetitive tasks  Goals  Plan Goals: Plan Goals: stg 6 wks    Pt to be educated/ independent with initial HEP specific to precautions/ restrictions/ correct sitting posture. met  Decreased (L) UT/ serratus anterior ttp from severe to minimal to allow for 6 hours continuous sleep.met    Increased cervical AROM in rotation/ sB  (R)= (L) to allow for increased ease w dressing/ grooming activities.met    ltg 12 wks    Increase cervical/ thoracic postural strength 4+/5 at 90 degrees to allow for increased ease with reaching/ light/ caring for son without evidence of pain > 2/10 consistently. ongoing    Pt to deny radicular pain (B) UE consistently to allow for increased ease with dressing/ grooming/ reaching activities. met    Improve oswestry score from 54% to 14% oingoing    Pt to be independent with progressed HEP specific to postural strengthening to allow for increased ease with lifting children without apprehension.     Plan  Therapy options: will be seen for skilled physical therapy services  Planned modality interventions: cryotherapy, electrical stimulation/Russian stimulation, TENS, ultrasound and thermotherapy (hydrocollator packs)  Planned therapy interventions: abdominal trunk  stabilization, manual therapy, motor coordination training, neuromuscular re-education, balance/weight-bearing training, ADL retraining, flexibility, functional ROM exercises, gait training, home exercise program, joint mobilization, transfer training, therapeutic activities, strengthening, stretching and soft tissue mobilization  Duration in weeks: 12  Treatment plan discussed with: patient           Recommendations: Continue as planned  Timeframe: 1 month  Prognosis to achieve goals: good    PT Signature: Ck Pennington, PT      Based upon review of the patient's progress and continued therapy plan, it is my medical opinion that Misty Pastrana should continue physical therapy treatment at [facility]  [address] [place] [address] [address] [place] [address]  [phone].    Signature: __________________________________  Jacques Turner APRN    Timed:  Manual Therapy:         mins  83936;  Therapeutic Exercise:      15   mins  40240;     Neuromuscular Melissa:        mins  47324;    Therapeutic Activity:      25    mins  13055;     Gait Training:           mins  65430;     Ultrasound:          mins  20486;    Electrical Stimulation:         mins  70997 ( );  Iontophoresis         mins 49280;  Dry Needling                   mins    Untimed:  Electrical Stimulation:         mins  34492 ( );  Mechanical Traction:         mins  71888;     Timed Treatment:      40mins   Total Treatment:    40    mins

## 2021-05-05 ENCOUNTER — TREATMENT (OUTPATIENT)
Dept: PHYSICAL THERAPY | Facility: CLINIC | Age: 41
End: 2021-05-05

## 2021-05-05 DIAGNOSIS — M54.2 CERVICAL PAIN: Primary | ICD-10-CM

## 2021-05-05 PROCEDURE — 97110 THERAPEUTIC EXERCISES: CPT | Performed by: PHYSICAL THERAPIST

## 2021-05-05 PROCEDURE — 97014 ELECTRIC STIMULATION THERAPY: CPT | Performed by: PHYSICAL THERAPIST

## 2021-05-05 PROCEDURE — 97530 THERAPEUTIC ACTIVITIES: CPT | Performed by: PHYSICAL THERAPIST

## 2021-05-06 NOTE — PROGRESS NOTES
Physical Therapy Daily Progress Note    Patient: Misty Pastrana   : 1980  Diagnosis/ICD-10 Code:  Cervical pain [M54.2]  Referring practitioner: DAREK Eugene  Date of Initial Visit: Type: THERAPY  Noted: 2020  Today's Date: 2021  Patient seen for 15 sessions           Subjective I had a derby part and over did it cleaning up after the party.  My neck and thoracic    Objective   See Exercise, Manual, and Modality Logs for complete treatment.       Assessment/Plan  Implemented tens to decreased (B) ut;  PT emphasis on thoracic mobility per pt tolerance. Decreased pain post treatment.  Full thoracic mobility post treament. Mild cuing with HEP. Moderate ttp (B) UT. Denies radicular symptoms. Denies headaches.        Timed:    Manual Therapy:       mins  51582;  Therapeutic Exercise:    15     mins  80836;     Neuromuscular Melissa:        mins  04541;    Therapeutic Activity:    17      mins  96774;     Gait Training:           mins  34815;     Ultrasound:          mins  38604;    Electrical Stimulation:       8  mins  22234 ( );  Iontophoresis         mins 00627;  Aquatic Therapy         mins 46611;  Dry Needling                   mins    Untimed:  Electrical Stimulation:         mins  07676 ( );  Mechanical Traction:         mins  73337;     Timed Treatment:    40  mins   Total Treatment:     40   mins  Ck Pennington, PT  Physical Therapist

## 2021-05-12 ENCOUNTER — TELEPHONE (OUTPATIENT)
Dept: PHYSICAL THERAPY | Facility: CLINIC | Age: 41
End: 2021-05-12

## 2021-05-19 ENCOUNTER — TREATMENT (OUTPATIENT)
Dept: PHYSICAL THERAPY | Facility: CLINIC | Age: 41
End: 2021-05-19

## 2021-05-19 DIAGNOSIS — M54.6 ACUTE LEFT-SIDED THORACIC BACK PAIN: ICD-10-CM

## 2021-05-19 DIAGNOSIS — M54.2 CERVICAL PAIN: Primary | ICD-10-CM

## 2021-05-19 DIAGNOSIS — M62.9 MUSCULOSKELETAL DISORDER INVOLVING UPPER TRAPEZIUS MUSCLE: ICD-10-CM

## 2021-05-19 PROCEDURE — 97110 THERAPEUTIC EXERCISES: CPT | Performed by: PHYSICAL THERAPIST

## 2021-05-19 PROCEDURE — 97530 THERAPEUTIC ACTIVITIES: CPT | Performed by: PHYSICAL THERAPIST

## 2021-05-19 PROCEDURE — 97140 MANUAL THERAPY 1/> REGIONS: CPT | Performed by: PHYSICAL THERAPIST

## 2021-05-20 NOTE — PROGRESS NOTES
Physical Therapy Daily Progress Note    Patient: Misty Pastrana   : 1980  Diagnosis/ICD-10 Code:  Cervical pain [M54.2]  Referring practitioner: DAREK Eugene  Date of Initial Visit: Type: THERAPY  Noted: 2020  Today's Date: 2021  Patient seen for 16 sessions           Subjective I am managing my pain much better.  No pain today. Exercises are helping. Sometimes the middle of my back feels real stiff.    Objective   See Exercise, Manual, and Modality Logs for complete treatment.       Assessment/Plan  Implemented manual T4-7 hypomobility; no cavitations segmental mobility improved with manual/ therapeutic exercise.  Continued with postural strengthening below 90. Mild cuing with HEP.  Improving scapular adduction (B) AROM.  All stg met         Timed:    Manual Therapy:  10      mins  96988;  Therapeutic Exercise:   15      mins  65808;     Neuromuscular Melissa:        mins  93550;    Therapeutic Activity:      15   mins  20954;     Gait Training:           mins  65098;     Ultrasound:          mins  94912;    Electrical Stimulation:         mins  96487 ( );  Iontophoresis         mins 85870;  Aquatic Therapy         mins 77508;  Dry Needling                   mins    Untimed:  Electrical Stimulation:         mins  60622 (MC );  Mechanical Traction:         mins  26566;     Timed Treatment:  40    mins   Total Treatment:      40  mins  Ck Pennington PT  Physical Therapist

## 2021-06-09 ENCOUNTER — TREATMENT (OUTPATIENT)
Dept: PHYSICAL THERAPY | Facility: CLINIC | Age: 41
End: 2021-06-09

## 2021-06-09 DIAGNOSIS — M54.2 CERVICAL PAIN: ICD-10-CM

## 2021-06-09 DIAGNOSIS — M62.9 MUSCULOSKELETAL DISORDER INVOLVING UPPER TRAPEZIUS MUSCLE: Primary | ICD-10-CM

## 2021-06-09 PROCEDURE — 97014 ELECTRIC STIMULATION THERAPY: CPT | Performed by: PHYSICAL THERAPIST

## 2021-06-09 PROCEDURE — 97530 THERAPEUTIC ACTIVITIES: CPT | Performed by: PHYSICAL THERAPIST

## 2021-06-09 PROCEDURE — 97110 THERAPEUTIC EXERCISES: CPT | Performed by: PHYSICAL THERAPIST

## 2021-06-10 NOTE — PROGRESS NOTES
Physical Therapy Daily Progress Note    Patient: Misty Pastrana   : 1980  Diagnosis/ICD-10 Code:  Musculoskeletal disorder involving upper trapezius muscle [M62.9]  Referring practitioner: DAREK Eugene  Date of Initial Visit: Type: THERAPY  Noted: 2020  Today's Date: 6/10/2021  Patient seen for 17 sessions           Subjective   (L) UT sore today.  Objective   See Exercise, Manual, and Modality Logs for complete treatment.       Assessment/Plan  Reviewed precautions/ restriction. Implemented cervical thoracic aROM/ mild postural strength. Implemented tens to decreased (L) UT ms guarding. Encouraged purchase of tens unit for home use on [employer]         Timed:    Manual Therapy:      mins  40701;  Therapeutic Exercise:    15    mins  42243;     Neuromuscular Melissa:        mins  70392;    Therapeutic Activity:    15      mins  04971;     Gait Training:           mins  34127;     Ultrasound:          mins  53709;    Electrical Stimulation:  10       mins  29856 ( );  Iontophoresis         mins 86193;  Aquatic Therapy         mins 89874;  Dry Needling                   mins    Untimed:  Electrical Stimulation:         mins  99087 ( );  Mechanical Traction:         mins  72819;     Timed Treatment:  40    mins   Total Treatment:     40   mins  Ck Pennington, PT  Physical Therapist

## 2021-07-01 ENCOUNTER — OFFICE VISIT (OUTPATIENT)
Dept: GASTROENTEROLOGY | Facility: CLINIC | Age: 41
End: 2021-07-01

## 2021-07-01 ENCOUNTER — TRANSCRIBE ORDERS (OUTPATIENT)
Dept: LAB | Facility: SURGERY CENTER | Age: 41
End: 2021-07-01

## 2021-07-01 ENCOUNTER — PREP FOR SURGERY (OUTPATIENT)
Dept: SURGERY | Facility: SURGERY CENTER | Age: 41
End: 2021-07-01

## 2021-07-01 VITALS
TEMPERATURE: 97.8 F | RESPIRATION RATE: 16 BRPM | HEART RATE: 76 BPM | WEIGHT: 123.2 LBS | HEIGHT: 64 IN | DIASTOLIC BLOOD PRESSURE: 76 MMHG | BODY MASS INDEX: 21.03 KG/M2 | OXYGEN SATURATION: 99 % | SYSTOLIC BLOOD PRESSURE: 124 MMHG

## 2021-07-01 DIAGNOSIS — Z80.0 FAMILY HISTORY OF COLON CANCER: ICD-10-CM

## 2021-07-01 DIAGNOSIS — R19.4 CHANGE IN BOWEL HABITS: Primary | ICD-10-CM

## 2021-07-01 DIAGNOSIS — K21.9 GASTROESOPHAGEAL REFLUX DISEASE, UNSPECIFIED WHETHER ESOPHAGITIS PRESENT: ICD-10-CM

## 2021-07-01 DIAGNOSIS — K58.0 IRRITABLE BOWEL SYNDROME WITH DIARRHEA: ICD-10-CM

## 2021-07-01 DIAGNOSIS — K21.9 GASTROESOPHAGEAL REFLUX DISEASE, UNSPECIFIED WHETHER ESOPHAGITIS PRESENT: Primary | ICD-10-CM

## 2021-07-01 DIAGNOSIS — R14.0 BLOATING: ICD-10-CM

## 2021-07-01 DIAGNOSIS — R10.33 PERIUMBILICAL PAIN: ICD-10-CM

## 2021-07-01 DIAGNOSIS — Z01.818 OTHER SPECIFIED PRE-OPERATIVE EXAMINATION: Primary | ICD-10-CM

## 2021-07-01 DIAGNOSIS — R10.13 EPIGASTRIC PAIN: ICD-10-CM

## 2021-07-01 PROBLEM — G47.00 INSOMNIA: Status: ACTIVE | Noted: 2021-04-13

## 2021-07-01 PROBLEM — F41.1 GENERALIZED ANXIETY DISORDER: Status: ACTIVE | Noted: 2020-12-05

## 2021-07-01 PROBLEM — J02.0 STREPTOCOCCAL SORE THROAT: Status: ACTIVE | Noted: 2019-11-21

## 2021-07-01 PROCEDURE — 99204 OFFICE O/P NEW MOD 45 MIN: CPT | Performed by: INTERNAL MEDICINE

## 2021-07-01 RX ORDER — MELOXICAM 15 MG/1
TABLET ORAL
COMMUNITY

## 2021-07-01 RX ORDER — NORETHINDRONE ACETATE AND ETHINYL ESTRADIOL 1; .02 MG/1; MG/1
TABLET ORAL
COMMUNITY
Start: 2021-04-23 | End: 2021-07-01

## 2021-07-01 RX ORDER — SODIUM CHLORIDE 0.9 % (FLUSH) 0.9 %
3 SYRINGE (ML) INJECTION EVERY 12 HOURS SCHEDULED
Status: CANCELLED | OUTPATIENT
Start: 2021-07-01

## 2021-07-01 RX ORDER — SODIUM CHLORIDE 0.9 % (FLUSH) 0.9 %
10 SYRINGE (ML) INJECTION AS NEEDED
Status: CANCELLED | OUTPATIENT
Start: 2021-07-01

## 2021-07-01 RX ORDER — SODIUM CHLORIDE, SODIUM LACTATE, POTASSIUM CHLORIDE, CALCIUM CHLORIDE 600; 310; 30; 20 MG/100ML; MG/100ML; MG/100ML; MG/100ML
30 INJECTION, SOLUTION INTRAVENOUS CONTINUOUS PRN
Status: CANCELLED | OUTPATIENT
Start: 2021-07-01

## 2021-07-01 RX ORDER — OMEPRAZOLE 20 MG/1
CAPSULE, DELAYED RELEASE ORAL
COMMUNITY
End: 2021-07-01

## 2021-07-01 NOTE — PATIENT INSTRUCTIONS
1. For further evaluation of epigastric pain, periumbilical pain, and bloating we will schedule an EGD and colonoscopy.    2. We will provide you with a sample course of Xifaxan. You will take 3 tabs daily until you complete your course of treatment.

## 2021-07-01 NOTE — PROGRESS NOTES
Chief Complaint   Patient presents with   • Abdominal Pain   • Bloated           History of Present Illness  40-year old female presents today for evaluation. She reports that since her  7 years ago she has had digestive issues. She takes omeprazole 20 mg once daily. She has stopped eating processed foods and keeps a food journal to track foods that will worsen symptoms.     Overeating will worsen epigastric/periumbilical pressure and discomfort and cause bloating. She will then have a sensation like she will have to have a BM for the rest of the night, but that does not always occur. She reports an average of 2 BMs per day that can be overly loose at times.     She reports colon cancer in both of her paternal grandparents. She takes fiber daily and maintains a high fiber diet.     She avoids starches. She has been tested for celiac disease, which was negative per her report. She has never had a colonoscopy.     She reports having a tubal ligation recently and feels that since her surgery symptoms above have worsened. She reports having an EGD at [facility] on 2019 and was seen by Dr. Ibarra.     She has a history of H. Pylori and completed the antibiotic regimen. She had follow up urea breath testing 6 months ago which was negative per her report.     Review of Systems   Constitutional: Negative for fever and unexpected weight change.   HENT: Negative for trouble swallowing.    Cardiovascular: Negative for chest pain.   Gastrointestinal: Positive for abdominal distention, abdominal pain and diarrhea. Negative for anal bleeding, blood in stool, constipation, nausea, rectal pain and vomiting.     Result Review :       H. Pylori Breath Test - , (2021 15:47)  CBC and Differential (03/15/2021 10:31)  naproxen (NAPROSYN) 500 MG tablet (2020)  ibuprofen (ADVIL,MOTRIN) 400 MG tablet  meloxicam (MOBIC) 15 MG tablet  omeprazole (priLOSEC) 20 MG capsule    Vital Signs:   /76   Pulse 76   Temp  "97.8 °F (36.6 °C) (Temporal)   Resp 16   Ht 162.6 cm (64\")   Wt 55.9 kg (123 lb 3.2 oz)   SpO2 99%   BMI 21.15 kg/m²     Body mass index is 21.15 kg/m².     Physical Exam  Vitals reviewed.   Constitutional:       Appearance: Normal appearance.   HENT:      Nose: No nasal deformity.   Eyes:      General: No scleral icterus.  Neck:      Comments: Trachea midline.  Cardiovascular:      Rate and Rhythm: Normal rate and regular rhythm.   Pulmonary:      Effort: No respiratory distress.      Breath sounds: Normal breath sounds.   Abdominal:      General: Bowel sounds are normal. There is no distension.      Palpations: Abdomen is soft. There is no mass.      Tenderness: There is abdominal tenderness.      Comments: mild   Lymphadenopathy:      Comments: No periumbilical lymphadenopathy.   Skin:     General: Skin is warm.   Neurological:      Mental Status: She is alert.         Documentation by Amita OSWALD acting as a scribe in the following sections (HPI, Assessment, Plan) for the undersigned provider.     Assessment and Plan    Diagnoses and all orders for this visit:    1. Gastroesophageal reflux disease, unspecified whether esophagitis present (Primary)    2. Bloating    3. Epigastric pain    4. Periumbilical pain    5. Irritable bowel syndrome with diarrhea       I have reviewed and confirmed the accuracy of the HPI and Assessment and Plan as documented by the APRDAREK White        Follow Up   No follow-ups on file.    Patient Instructions   1. For further evaluation of epigastric pain, periumbilical pain, and bloating we will schedule an EGD and colonoscopy.    2. We will provide you with a sample course of Xifaxan. You will take 3 tabs daily until you complete your course of treatment.         "

## 2021-07-14 ENCOUNTER — TELEPHONE (OUTPATIENT)
Dept: PHYSICAL THERAPY | Facility: CLINIC | Age: 41
End: 2021-07-14

## 2021-07-19 ENCOUNTER — TELEPHONE (OUTPATIENT)
Dept: GASTROENTEROLOGY | Facility: CLINIC | Age: 41
End: 2021-07-19

## 2021-07-19 NOTE — TELEPHONE ENCOUNTER
Would recommend a trial of MiraLAX over-the-counter to help with the constipation and see if more regular and complete bowel movements would lead to improvement in the pain and bloating.

## 2021-07-29 ENCOUNTER — TELEPHONE (OUTPATIENT)
Dept: PHYSICAL THERAPY | Facility: CLINIC | Age: 41
End: 2021-07-29

## 2021-10-15 ENCOUNTER — TRANSCRIBE ORDERS (OUTPATIENT)
Dept: LAB | Facility: SURGERY CENTER | Age: 41
End: 2021-10-15

## 2021-10-15 DIAGNOSIS — Z01.818 OTHER SPECIFIED PRE-OPERATIVE EXAMINATION: Primary | ICD-10-CM

## 2021-11-01 ENCOUNTER — LAB (OUTPATIENT)
Dept: LAB | Facility: SURGERY CENTER | Age: 41
End: 2021-11-01

## (undated) DEVICE — ENSEAL TRIO TEMPERATURE CONTOLLED TISSUE SEALING TECHNOLOGY DISPOSABLE TISSUE SEALING DEVICE TAPTRONIC TRIGGER ACTIVATED POWER 3MM CURVED JAW: Brand: ENSEAL

## (undated) DEVICE — TBG INSUFL W FLTR STRL

## (undated) DEVICE — SUT MNCRYL 4/0 PS2 18 IN

## (undated) DEVICE — GLV SURG SENSICARE W/ALOE PF LF 7.5 STRL

## (undated) DEVICE — ENDOPATH XCEL BLADELESS TROCARS WITH STABILITY SLEEVES: Brand: ENDOPATH XCEL

## (undated) DEVICE — LAG GYN LAPAROSCOPY: Brand: MEDLINE INDUSTRIES, INC.

## (undated) DEVICE — ENDOPATH XCEL UNIVERSAL TROCAR STABLILITY SLEEVES: Brand: ENDOPATH XCEL

## (undated) DEVICE — LAPAROSCOPIC SMOKE FILTRATION SYSTEM: Brand: PALL LAPAROSHIELD® PLUS LAPAROSCOPIC SMOKE FILTRATION SYSTEM

## (undated) DEVICE — SKIN AFFIX SURG ADHESIVE 72/CS 0.55ML: Brand: MEDLINE